# Patient Record
Sex: FEMALE | Race: BLACK OR AFRICAN AMERICAN | NOT HISPANIC OR LATINO | Employment: PART TIME | URBAN - NONMETROPOLITAN AREA
[De-identification: names, ages, dates, MRNs, and addresses within clinical notes are randomized per-mention and may not be internally consistent; named-entity substitution may affect disease eponyms.]

---

## 2018-07-05 PROCEDURE — 87510 GARDNER VAG DNA DIR PROBE: CPT | Performed by: FAMILY MEDICINE

## 2018-07-05 PROCEDURE — 87660 TRICHOMONAS VAGIN DIR PROBE: CPT | Performed by: FAMILY MEDICINE

## 2018-07-05 PROCEDURE — 87591 N.GONORRHOEAE DNA AMP PROB: CPT | Performed by: FAMILY MEDICINE

## 2018-07-05 PROCEDURE — 87491 CHLMYD TRACH DNA AMP PROBE: CPT | Performed by: FAMILY MEDICINE

## 2018-07-05 PROCEDURE — 87480 CANDIDA DNA DIR PROBE: CPT | Performed by: FAMILY MEDICINE

## 2018-07-05 PROCEDURE — 87661 TRICHOMONAS VAGINALIS AMPLIF: CPT | Performed by: FAMILY MEDICINE

## 2018-07-08 ENCOUNTER — TRANSCRIBE ORDERS (OUTPATIENT)
Dept: URGENT CARE | Facility: CLINIC | Age: 18
End: 2018-07-08

## 2018-07-08 DIAGNOSIS — B96.89 BV (BACTERIAL VAGINOSIS): Primary | ICD-10-CM

## 2018-07-08 DIAGNOSIS — N76.0 BV (BACTERIAL VAGINOSIS): Primary | ICD-10-CM

## 2018-07-08 RX ORDER — METRONIDAZOLE 500 MG/1
500 TABLET ORAL 2 TIMES DAILY
Qty: 14 TABLET | Refills: 0 | Status: SHIPPED | OUTPATIENT
Start: 2018-07-08 | End: 2018-07-15

## 2020-07-14 ENCOUNTER — LAB (OUTPATIENT)
Dept: LAB | Facility: HOSPITAL | Age: 20
End: 2020-07-14

## 2020-07-14 ENCOUNTER — INITIAL PRENATAL (OUTPATIENT)
Dept: OBSTETRICS AND GYNECOLOGY | Facility: CLINIC | Age: 20
End: 2020-07-14

## 2020-07-14 VITALS — WEIGHT: 127.6 LBS | SYSTOLIC BLOOD PRESSURE: 92 MMHG | BODY MASS INDEX: 22.6 KG/M2 | DIASTOLIC BLOOD PRESSURE: 56 MMHG

## 2020-07-14 VITALS — DIASTOLIC BLOOD PRESSURE: 56 MMHG | SYSTOLIC BLOOD PRESSURE: 92 MMHG | WEIGHT: 127.6 LBS | BODY MASS INDEX: 22.6 KG/M2

## 2020-07-14 DIAGNOSIS — Z32.00 PREGNANCY EXAMINATION OR TEST, PREGNANCY UNCONFIRMED: ICD-10-CM

## 2020-07-14 DIAGNOSIS — Z34.80 SUPERVISION OF OTHER NORMAL PREGNANCY: Primary | ICD-10-CM

## 2020-07-14 DIAGNOSIS — O09.30 LATE PRENATAL CARE, ANTEPARTUM: ICD-10-CM

## 2020-07-14 LAB
ABO GROUP BLD: NORMAL
AMPHET+METHAMPHET UR QL: NEGATIVE
AMPHETAMINES UR QL: NEGATIVE
B-HCG UR QL: POSITIVE
BARBITURATES UR QL SCN: NEGATIVE
BENZODIAZ UR QL SCN: NEGATIVE
BILIRUB UR QL STRIP: NEGATIVE
BLD GP AB SCN SERPL QL: NEGATIVE
BUPRENORPHINE SERPL-MCNC: NEGATIVE NG/ML
CANNABINOIDS SERPL QL: NEGATIVE
CLARITY UR: ABNORMAL
COCAINE UR QL: NEGATIVE
COLOR UR: YELLOW
GLUCOSE UR STRIP-MCNC: NEGATIVE MG/DL
HGB S BLD QL: NEGATIVE
HGB UR QL STRIP.AUTO: NEGATIVE
INTERNAL NEGATIVE CONTROL: NEGATIVE
INTERNAL POSITIVE CONTROL: POSITIVE
KETONES UR QL STRIP: NEGATIVE
LEUKOCYTE ESTERASE UR QL STRIP.AUTO: ABNORMAL
Lab: ABNORMAL
Lab: NORMAL
METHADONE UR QL SCN: NEGATIVE
NITRITE UR QL STRIP: NEGATIVE
OPIATES UR QL: NEGATIVE
OXYCODONE UR QL SCN: NEGATIVE
PCP UR QL SCN: NEGATIVE
PH UR STRIP.AUTO: 6.5 [PH] (ref 5–8)
PROPOXYPH UR QL: NEGATIVE
PROT UR QL STRIP: ABNORMAL
RH BLD: POSITIVE
SP GR UR STRIP: 1.03 (ref 1–1.03)
TRICYCLICS UR QL SCN: NEGATIVE
UROBILINOGEN UR QL STRIP: ABNORMAL

## 2020-07-14 PROCEDURE — 87661 TRICHOMONAS VAGINALIS AMPLIF: CPT | Performed by: FAMILY MEDICINE

## 2020-07-14 PROCEDURE — 86803 HEPATITIS C AB TEST: CPT | Performed by: FAMILY MEDICINE

## 2020-07-14 PROCEDURE — 81025 URINE PREGNANCY TEST: CPT | Performed by: FAMILY MEDICINE

## 2020-07-14 PROCEDURE — 99203 OFFICE O/P NEW LOW 30 MIN: CPT | Performed by: FAMILY MEDICINE

## 2020-07-14 PROCEDURE — 36415 COLL VENOUS BLD VENIPUNCTURE: CPT

## 2020-07-14 PROCEDURE — 86900 BLOOD TYPING SEROLOGIC ABO: CPT | Performed by: FAMILY MEDICINE

## 2020-07-14 PROCEDURE — 81003 URINALYSIS AUTO W/O SCOPE: CPT | Performed by: FAMILY MEDICINE

## 2020-07-14 PROCEDURE — 86850 RBC ANTIBODY SCREEN: CPT | Performed by: FAMILY MEDICINE

## 2020-07-14 PROCEDURE — 87086 URINE CULTURE/COLONY COUNT: CPT | Performed by: FAMILY MEDICINE

## 2020-07-14 PROCEDURE — 87491 CHLMYD TRACH DNA AMP PROBE: CPT | Performed by: FAMILY MEDICINE

## 2020-07-14 PROCEDURE — 80081 OBSTETRIC PANEL INC HIV TSTG: CPT | Performed by: FAMILY MEDICINE

## 2020-07-14 PROCEDURE — 87591 N.GONORRHOEAE DNA AMP PROB: CPT | Performed by: FAMILY MEDICINE

## 2020-07-14 PROCEDURE — 80306 DRUG TEST PRSMV INSTRMNT: CPT | Performed by: FAMILY MEDICINE

## 2020-07-14 PROCEDURE — 86901 BLOOD TYPING SEROLOGIC RH(D): CPT | Performed by: FAMILY MEDICINE

## 2020-07-14 PROCEDURE — 85660 RBC SICKLE CELL TEST: CPT | Performed by: FAMILY MEDICINE

## 2020-07-14 RX ORDER — CALCIUM CARBONATE 300MG(750)
1 TABLET,CHEWABLE ORAL EVERY MORNING
Qty: 30 TABLET | Refills: 11 | Status: SHIPPED | OUTPATIENT
Start: 2020-07-14 | End: 2020-09-14

## 2020-07-14 RX ORDER — CALCIUM CARBONATE 300MG(750)
TABLET,CHEWABLE ORAL
COMMUNITY
End: 2020-07-14 | Stop reason: SDUPTHER

## 2020-07-14 NOTE — PROGRESS NOTES
[facility]  Obstetrics Visit    CHIEF COMPLAINT:  New prenatal visit    HISTORY OF PRESENT ILLNESS:  Arik Fuentes is a 19 y.o. y/o  at ~ 25wk by fundal height.  Unknown LMP (No LMP recorded. Patient is pregnant.). This was a unplanned pregnancy and the patient is supported by FOB. Reports mild nausea earlier in the pregnancy but that has resolved.   She denies any vaginal bleeding. She has started taking a prenatal vitamin.  Plans to Breastfeed.    PRENATAL RISK FACTORS   Problems (from 20 to present)     No problems associated with this episode.          DATING CRITERIA:  No LMP recorded (lmp unknown). Patient is pregnant. -- Estimated Date of Delivery: None noted.    OBSTETRIC HISTORY:  OB History    Para Term  AB Living   2 1 1     1   SAB TAB Ectopic Molar Multiple Live Births             1      # Outcome Date GA Lbr Steffen/2nd Weight Sex Delivery Anes PTL Lv   2 Current            1 Term 19 38w0d  3062 g (6 lb 12 oz) M Vag-Spont None N MY     GYN HISTORY:  Denies h/o sexually transmitted infections/pelvic inflammatory disease  Denies h/o abnormal pap smears, last pap was   Last Completed Pap Smear       Status Date      PAP SMEAR No completions recorded        Denies h/o gynecologic surgeries, including biopsies of the cervix    PAST MEDICAL HISTORY:  Past Medical History:   Diagnosis Date   • Acute bronchitis    • Acute sinusitis    • Acute upper respiratory infection    • Acute urinary tract infection    • Allergic rhinitis    • Atopic dermatitis    • Chondromalacia of patella    • Chronic rhinitis    • Common cold    • Conjunctivitis     OS   • Cough    • Diarrhea    • Dysuria    • Gastroesophageal reflux disease    • Headache     not wearing glasses   • Influenza due to influenza A subtype H1N1 virus    • Irregular periods    • Knee pain     right knee discomfort   • Nausea     viral   • Nausea and vomiting    • Other seborrheic dermatitis    •  Otitis media    • Pustular acne    • Rash    • Rhinitis    • Sleep apnea    • Upper respiratory infection    • Viral gastroenteritis    • Vulvovaginitis    • Well child check      PAST SURGICAL HISTORY:  Past Surgical History:   Procedure Laterality Date   • ADENOIDECTOMY     • TONSILLECTOMY AND ADENOIDECTOMY  11/09/2005    obstructive sleep apnea syndrome     FAMILY HISTORY:  Family History   Problem Relation Age of Onset   • Asthma Other    • Hypertension Other    • Migraines Other    • Asthma Brother    • Asthma Brother    • Asthma Brother    • Heart defect Brother    • Cancer Brother         of the eye at birth     SOCIAL HISTORY:  Social History     Socioeconomic History   • Marital status: Single     Spouse name: Not on file   • Number of children: Not on file   • Years of education: Not on file   • Highest education level: Not on file   Tobacco Use   • Smoking status: Former Smoker   • Smokeless tobacco: Never Used   Substance and Sexual Activity   • Alcohol use: Never     Frequency: Never   • Drug use: Never   • Sexual activity: Yes     Partners: Male     Comment: unsure of last pap smear      GENETIC SCREENING:  Age >36 yo as of SACHA: no  Thalassemia: no  NTD: no  CHD: no  Down Syndrome/MR/Fragile X/Autism: no  Ashkenazi Presybeterian with Dorian-Sachs, [place], familial dysautonomia: no  Sickle cell disease or trait: unknown  Hemophilia: no  Muscular dystrophy: no  Cystic fibrosis: no  [place]'s chorea: no  Birth defects: no  Genetic/chromosomal disorders: no    INFECTION HISTORY:  TB exposure: no  HSV: no  Illness since LMP: no  Prior GBS infected child: no  STIs: no    ALLERGIES:  No Known Allergies  MEDICATIONS:  Prior to Admission medications    Medication Sig Start Date End Date Taking? Authorizing Provider   cefprozil (CEFZIL) 250 MG tablet Take 250 mg by mouth 2 (Two) Times a Day. 11/1/16   Provider, MD Edd   Prenatal MV-Min-FA-Omega-3 (PRENATAL GUMMIES/DHA & FA) 0.4-32.5 MG chewable tablet Chew.     Provider, MD Edd   phenazopyridine (PYRIDIUM) 200 MG tablet Take 200 mg by mouth 3 (Three) Times a Day As Needed for bladder spasms. 16  ProviderEdd MD       REVIEW OF SYSTEMS  GEN: no fever or chills  CVS: no chest pain or palpitations  RESP: no cough, no shortness of breath  BREAST: no masses or nipple discharge  GI: no nausea, no vomiting, no diarrhea, constipation or abdominal pain  : no dysuria, no vaginal discharge, no vaginal bleeding  CNS: no dizziness or lightheadedness  DERM: no rash, no lesions  MUSC: no myalgias, no gait disturbance  Psych: no depression or anxiety    PHYSICAL EXAM:   BP 92/56   Wt 57.9 kg (127 lb 9.6 oz)   BMI 22.60 kg/m²   General: Alert, healthy, no distress, well nourished and well developed.  Neurologic: Alert, oriented to person, place, and time.  Gait normal.  Cranial nerves II-XII grossly intact.  HEENT: Normocephalic, atraumatic.  Extraocular muscles intact, pupils equal and reactive x2.    Neck: Supple, trachea midline.  Lungs: Clear to auscultation bilaterally, normal effort. No wheezes/rhonchi/rales.  Heart: Regular rate and rhythm.  No murmer, rub or gallop.  Abdomen: Soft, non-tender, gravid. FH 25cm  Skin: No rash, no lesions.  Extremities: No cyanosis, clubbing or edema.  PELVIC EXAM: deferred  Psych: EPDS 8    Bedside ultrasound performed by myself which shows the findings below:  IUP, +cardiac activity, ~25w GA    IMPRESSION:  Arik Fuentes is a 19 y.o.  at ~25wk GA for a new prenatal visit.    PLAN:  1.  IUP at ~25wk by fundal height  - Prenatal labs ordered including Type and Screen, CBC, Rubella, RPR, Hepatitis B and C and HIV  - Gonorrhea and Chlamydia cultures collected from urine  - Urinalysis and urine culture obtained  - Dating/anatomy ultrasound ordered  For 20 10:30AM  - Continue prenatal vitamins  - Weight gain counseling performed.   - BMI <18.5: Recommend 28-40 lb weight gain   - BMI 18.5-24.9:  25-35 lb   - BMI 25-29.9: 15-25 lb   - BMI >30: 11-20 lb    - Return to clinic in 3 weeks for return prenatal visit + 3T labs    Signature  Marsha Randall MD  [facility] [place] [id number]  Office: [phone]    This document has been electronically signed by Marsha Randall MD on July 14, 2020 15:22

## 2020-07-14 NOTE — PROGRESS NOTES
I spent approximately 60 minutes with the patient acquiring the health and history intake and discussing topics related to healthy lifestyle. This is her 2nd pregnancy. Her LMP is January or February. She said she has been moving to [place] and then back to [place] is why she has had no prenatal care.  She had a vaginal delivery in [place]. She signed a release for us to get the delivery note. A newob bag is given. The 1st trimester teaching was done with the patient. We discussed a healthy diet and exercise and what is recommended. I also discussed Listeriosis and Toxoplasmosis and what fish to avoid due to high mercury levels. Informed patient not to be in hot tubs, saunas, or tanning beds. We discussed that spotting may occur after intercourse which is common, but if heavy bleeding like a period occurs to call the [facility] or hospital if clinic is closed.  I encouraged her to make an appointment with the dentist if she has not had a dental exam and cleaning in the last 6 months. The patient denies use of alcohol, illicit drug use, and tobacco smoking. She plans to and  Breastfeed again. I gave her pamphlet on breastfeeding classes and the breastfeeding mothers support group that is provided by Debra Bailey, Lactation Consultant. We discussed the resources that is offered at the health departments, and we discussed that some health departments have a breastfeeding peer counselor. She filled out the health department referral form and depression screening questionnaire. I informed patient that group prenatal education classes are offered here. I instructed patient to let the provider know if she would like to join a group after EDC is established. A Centering Pregnancy pamphlet is given.  I discussed with the patient that a pediatrician needs to be chosen prior to delivery for the infant to have an appointment scheduled before leaving the hospital.  I discussed lab tests will be done today. I let her know a  UDS will be done today and at the time of delivery. She is unsure of when her last pap smear was done. However, she signed a release for us to get the pap smear results. I encouraged the patient to get the TDAP vaccine in the 3rd trimester. She is scheduled to get her ultrasound this Friday, July 17. She is seeing Dr. Randall today.  All questions were answered at this time.

## 2020-07-15 LAB
BASOPHILS # BLD AUTO: 0.03 10*3/MM3 (ref 0–0.2)
BASOPHILS NFR BLD AUTO: 0.4 % (ref 0–1.5)
C TRACH RRNA CVX QL NAA+PROBE: NEGATIVE
DEPRECATED RDW RBC AUTO: 46.4 FL (ref 37–54)
EOSINOPHIL # BLD AUTO: 0.03 10*3/MM3 (ref 0–0.4)
EOSINOPHIL NFR BLD AUTO: 0.4 % (ref 0.3–6.2)
ERYTHROCYTE [DISTWIDTH] IN BLOOD BY AUTOMATED COUNT: 14.7 % (ref 12.3–15.4)
HBV SURFACE AG SERPL QL IA: NORMAL
HCT VFR BLD AUTO: 30.9 % (ref 34–46.6)
HCV AB SER DONR QL: NORMAL
HGB BLD-MCNC: 10.2 G/DL (ref 12–15.9)
HIV1+2 AB SER QL: NORMAL
HOLD SPECIMEN: NORMAL
IMM GRANULOCYTES # BLD AUTO: 0.02 10*3/MM3 (ref 0–0.05)
IMM GRANULOCYTES NFR BLD AUTO: 0.3 % (ref 0–0.5)
LYMPHOCYTES # BLD AUTO: 1.28 10*3/MM3 (ref 0.7–3.1)
LYMPHOCYTES NFR BLD AUTO: 18.4 % (ref 19.6–45.3)
MCH RBC QN AUTO: 28.2 PG (ref 26.6–33)
MCHC RBC AUTO-ENTMCNC: 33 G/DL (ref 31.5–35.7)
MCV RBC AUTO: 85.4 FL (ref 79–97)
MONOCYTES # BLD AUTO: 0.39 10*3/MM3 (ref 0.1–0.9)
MONOCYTES NFR BLD AUTO: 5.6 % (ref 5–12)
N GONORRHOEA RRNA SPEC QL NAA+PROBE: NEGATIVE
NEUTROPHILS NFR BLD AUTO: 5.2 10*3/MM3 (ref 1.7–7)
NEUTROPHILS NFR BLD AUTO: 74.9 % (ref 42.7–76)
NRBC BLD AUTO-RTO: 0 /100 WBC (ref 0–0.2)
PLATELET # BLD AUTO: 292 10*3/MM3 (ref 140–450)
PMV BLD AUTO: 10.4 FL (ref 6–12)
RBC # BLD AUTO: 3.62 10*6/MM3 (ref 3.77–5.28)
RUBV IGG SERPL IA-ACNC: POSITIVE
TRICHOMONAS VAGINALIS PCR: NEGATIVE
WBC # BLD AUTO: 6.95 10*3/MM3 (ref 3.4–10.8)

## 2020-07-16 LAB
BACTERIA SPEC AEROBE CULT: NORMAL
RPR SER QL: NORMAL

## 2020-07-21 DIAGNOSIS — Z36.3 ANTENATAL SCREENING FOR MALFORMATION USING ULTRASONICS: Primary | ICD-10-CM

## 2020-07-21 DIAGNOSIS — Z36.2 ENCOUNTER FOR REPEAT ULTRASOUND FOR FETAL HEART RATE: ICD-10-CM

## 2020-07-23 DIAGNOSIS — Z36.3 ANTENATAL SCREENING FOR MALFORMATION USING ULTRASONICS: ICD-10-CM

## 2020-08-04 DIAGNOSIS — Z13.1 ENCOUNTER FOR SCREENING FOR DIABETES MELLITUS: Primary | ICD-10-CM

## 2020-08-12 ENCOUNTER — ROUTINE PRENATAL (OUTPATIENT)
Dept: OBSTETRICS AND GYNECOLOGY | Facility: CLINIC | Age: 20
End: 2020-08-12

## 2020-08-12 ENCOUNTER — LAB (OUTPATIENT)
Dept: LAB | Facility: HOSPITAL | Age: 20
End: 2020-08-12

## 2020-08-12 VITALS — BODY MASS INDEX: 23.06 KG/M2 | WEIGHT: 130.2 LBS | SYSTOLIC BLOOD PRESSURE: 94 MMHG | DIASTOLIC BLOOD PRESSURE: 62 MMHG

## 2020-08-12 DIAGNOSIS — O09.30 LATE PRENATAL CARE, ANTEPARTUM: ICD-10-CM

## 2020-08-12 DIAGNOSIS — Z34.80 SUPERVISION OF OTHER NORMAL PREGNANCY: ICD-10-CM

## 2020-08-12 DIAGNOSIS — Z13.1 ENCOUNTER FOR SCREENING FOR DIABETES MELLITUS: ICD-10-CM

## 2020-08-12 DIAGNOSIS — IMO0002 EVALUATE ANATOMY NOT SEEN ON PRIOR SONOGRAM: ICD-10-CM

## 2020-08-12 DIAGNOSIS — Z3A.27 27 WEEKS GESTATION OF PREGNANCY: Primary | ICD-10-CM

## 2020-08-12 LAB
BASOPHILS # BLD AUTO: 0.03 10*3/MM3 (ref 0–0.2)
BASOPHILS NFR BLD AUTO: 0.4 % (ref 0–1.5)
DEPRECATED RDW RBC AUTO: 45.4 FL (ref 37–54)
DIFFERENTIAL METHOD BLD: ABNORMAL
EOSINOPHIL # BLD AUTO: 0.07 10*3/MM3 (ref 0–0.4)
EOSINOPHIL NFR BLD AUTO: 0.9 % (ref 0.3–6.2)
ERYTHROCYTE [DISTWIDTH] IN BLOOD BY AUTOMATED COUNT: 14.5 % (ref 12.3–15.4)
GLUCOSE 1H P 100 G GLC PO SERPL-MCNC: 81 MG/DL (ref 60–140)
HCT VFR BLD AUTO: 27.6 % (ref 34–46.6)
HGB BLD-MCNC: 9.1 G/DL (ref 12–15.9)
IMM GRANULOCYTES # BLD AUTO: 0.03 10*3/MM3 (ref 0–0.05)
IMM GRANULOCYTES NFR BLD AUTO: 0.4 % (ref 0–0.5)
LYMPHOCYTES # BLD AUTO: 1.17 10*3/MM3 (ref 0.7–3.1)
LYMPHOCYTES NFR BLD AUTO: 15.4 % (ref 19.6–45.3)
MCH RBC QN AUTO: 28.3 PG (ref 26.6–33)
MCHC RBC AUTO-ENTMCNC: 33 G/DL (ref 31.5–35.7)
MCV RBC AUTO: 85.7 FL (ref 79–97)
MONOCYTES # BLD AUTO: 0.42 10*3/MM3 (ref 0.1–0.9)
MONOCYTES NFR BLD AUTO: 5.5 % (ref 5–12)
NEUTROPHILS NFR BLD AUTO: 5.88 10*3/MM3 (ref 1.7–7)
NEUTROPHILS NFR BLD AUTO: 77.4 % (ref 42.7–76)
NRBC BLD AUTO-RTO: 0 /100 WBC (ref 0–0.2)
PLATELET # BLD AUTO: 256 10*3/MM3 (ref 140–450)
PMV BLD AUTO: 11.1 FL (ref 6–12)
RBC # BLD AUTO: 3.22 10*6/MM3 (ref 3.77–5.28)
WBC # BLD AUTO: 7.6 10*3/MM3 (ref 3.4–10.8)
WBC # BLD AUTO: 7.6 10*3/MM3 (ref 3.4–10.8)

## 2020-08-12 PROCEDURE — 99213 OFFICE O/P EST LOW 20 MIN: CPT | Performed by: FAMILY MEDICINE

## 2020-08-12 PROCEDURE — 36415 COLL VENOUS BLD VENIPUNCTURE: CPT

## 2020-08-12 PROCEDURE — 82950 GLUCOSE TEST: CPT

## 2020-08-12 PROCEDURE — 85025 COMPLETE CBC W/AUTO DIFF WBC: CPT

## 2020-08-12 NOTE — PROGRESS NOTES
CC: Prenatal visit    Arik Fuentes is a 20 y.o.  at 27w5d.  Doing well.  No complaints.  Denies contractions, LOF, or VB.  Reports good FM. 1hr glucola in progress.     US: cephalic, posterior placenta, FHR 143bpm, EFW 1092g (2lb7oz) 35%tile, AC 24%tile, sub opt spine remains. Findings d/w patient/FOB    BP 94/62   Wt 59.1 kg (130 lb 3.2 oz)   LMP  (LMP Unknown)   BMI 23.06 kg/m²   SVE: deferred  Fundal Height (cm): 37 cm  Fetal Heart Rate: 143 US     Problems (from 20 to present)     Problem Noted Resolved    Late prenatal care, antepartum 2020 by Marsha Randall MD No    Supervision of other normal pregnancy 2020 by Marsha Randall MD No    Overview Addendum 2020  8:00 AM by Marsha Randall MD     A POS/ Rubella Immune/ GBS @ 36wks  Dating: LMP ? 1TUS  Genetics: n/a  Tdap: 28wks  Flu: n/a  Anatomy: 20wks  1h Glucola: 28wks  Lab Results   Component Value Date    HGB 10.2 (L) 2020    HCT 30.9 (L) 2020     2020     Feeding Method: Breastfeed  BC plan: ?                 A/P: Arik Fuentes is a 20 y.o.  at 27w5d.  - RTC in 3 weeks with TAUS for sub opt views  - FKC reviewed.  PTL precautions given.  Encouraged to drink 3-4 glasses of ice water if she experiences contractions.  If contractions occur regularly (every 5-10 minutes or less) for 1 hour and do not resolve with drinking fluids and/or taking a warm bath, patient advised to come to L&D for evaluation.  - TDAP at next visit      Diagnosis Plan   1. 27 weeks gestation of pregnancy     2. Late prenatal care, antepartum     3. Supervision of other normal pregnancy     4. Evaluate anatomy not seen on prior sonogram  US Ob Follow Up Transabdominal Approach       Signature  Marsha Randall MD  [facility] [address]  Office: [phone]      This document has been electronically signed by  Marsha Randall MD on August 12, 2020 09:58

## 2020-08-13 ENCOUNTER — TELEPHONE (OUTPATIENT)
Dept: OBSTETRICS AND GYNECOLOGY | Facility: CLINIC | Age: 20
End: 2020-08-13

## 2020-08-13 DIAGNOSIS — O99.019 MATERNAL ANEMIA IN PREGNANCY, ANTEPARTUM: Primary | ICD-10-CM

## 2020-08-13 RX ORDER — FERROUS SULFATE 325(65) MG
325 TABLET ORAL
Qty: 30 TABLET | Refills: 3 | Status: SHIPPED | OUTPATIENT
Start: 2020-08-13 | End: 2021-06-28

## 2020-08-13 RX ORDER — AMOXICILLIN 250 MG
1 CAPSULE ORAL DAILY
Qty: 30 TABLET | Refills: 2 | Status: SHIPPED | OUTPATIENT
Start: 2020-08-13 | End: 2021-06-28

## 2020-08-13 NOTE — TELEPHONE ENCOUNTER
----- Message from Marsha Randall MD sent at 8/12/2020  5:34 PM CDT -----  Please let patient know she passed her glucose screening.

## 2020-08-17 ENCOUNTER — TELEPHONE (OUTPATIENT)
Dept: OBSTETRICS AND GYNECOLOGY | Facility: CLINIC | Age: 20
End: 2020-08-17

## 2020-08-17 NOTE — TELEPHONE ENCOUNTER
----- Message from Marsha Randall MD sent at 8/13/2020  4:52 PM CDT -----  Please inform patient iron supplement for her anemia.  I have also sent in a stool softener so she doesn't get constipated from the iron.

## 2020-08-17 NOTE — TELEPHONE ENCOUNTER
I called this patient and informed her of the iron results.  I also told her about the stool softener.

## 2020-08-18 DIAGNOSIS — Z36.2 ENCOUNTER FOR REPEAT ULTRASOUND FOR FETAL HEART RATE: ICD-10-CM

## 2020-09-14 ENCOUNTER — ROUTINE PRENATAL (OUTPATIENT)
Dept: OBSTETRICS AND GYNECOLOGY | Facility: CLINIC | Age: 20
End: 2020-09-14

## 2020-09-14 VITALS — BODY MASS INDEX: 22.75 KG/M2 | SYSTOLIC BLOOD PRESSURE: 92 MMHG | DIASTOLIC BLOOD PRESSURE: 64 MMHG | WEIGHT: 128.4 LBS

## 2020-09-14 DIAGNOSIS — Z3A.32 32 WEEKS GESTATION OF PREGNANCY: Primary | ICD-10-CM

## 2020-09-14 DIAGNOSIS — Z34.80 SUPERVISION OF OTHER NORMAL PREGNANCY: ICD-10-CM

## 2020-09-14 DIAGNOSIS — Z28.21 TETANUS, DIPHTHERIA, AND ACELLULAR PERTUSSIS (TDAP) VACCINATION DECLINED: ICD-10-CM

## 2020-09-14 DIAGNOSIS — O09.30 LATE PRENATAL CARE, ANTEPARTUM: ICD-10-CM

## 2020-09-14 PROCEDURE — 99213 OFFICE O/P EST LOW 20 MIN: CPT | Performed by: FAMILY MEDICINE

## 2020-09-14 RX ORDER — FAMOTIDINE 20 MG
1 TABLET ORAL DAILY
Qty: 60 EACH | Refills: 3 | Status: SHIPPED | OUTPATIENT
Start: 2020-09-14 | End: 2021-06-28

## 2020-09-14 NOTE — PROGRESS NOTES
CC: Prenatal visit    Arik Fuentes is a 20 y.o.  at 32w3d.  Doing well.  No complaints.  Denies contractions, LOF, or VB.  Reports good FM.    TAUS: cephalic, posterior placenta, FHR 130bpm,  (4lb3oz) 34%tile, AC 30%tile, BPP 8/8, previous sub opt views obtained - anatomy wnl.     BP 92/64   Wt 58.2 kg (128 lb 6.4 oz)   LMP  (LMP Unknown)   BMI 22.75 kg/m²   SVE: deferred  Fundal Height (cm): 32 cm  Fetal Heart Rate: 130     Problems (from 20 to present)     Problem Noted Resolved    Late prenatal care, antepartum 2020 by Marsha Randall MD No    Supervision of other normal pregnancy 2020 by Marsha Randall MD No    Overview Addendum 2020  5:34 PM by Marsha Randall MD     A POS/ Rubella Immune/ GBS @ 36wks  Dating: LMP ? 1TUS  Genetics: n/a  Tdap: 28wks  Flu: n/a  Anatomy: 20wks  1h Glucola: 81  Lab Results   Component Value Date    HGB 10.2 (L) 2020    HCT 30.9 (L) 2020     2020     Feeding Method: Breastfeed  BC plan: ?                 A/P: Arik Fuentes is a 20 y.o.  at 32w3d.  - RTC in 2 weeks  - Declined TDAP  - FKC reviewed.  PTL precautions given.  Encouraged to drink 3-4 glasses of ice water if she experiences contractions.  If contractions occur regularly (every 5-10 minutes or less) for 1 hour and do not resolve with drinking fluids and/or taking a warm bath, patient advised to come to L&D for evaluation.       Diagnosis Plan   1. 32 weeks gestation of pregnancy     2. Late prenatal care, antepartum     3. Supervision of other normal pregnancy     4. Tetanus, diphtheria, and acellular pertussis (Tdap) vaccination declined         Signature  Marsha Randall MD  [facility] [address] [address]  Office: [phone]      This document has been electronically signed by Marsha Randall MD on 2020 14:16 CDT

## 2020-09-21 DIAGNOSIS — IMO0002 EVALUATE ANATOMY NOT SEEN ON PRIOR SONOGRAM: ICD-10-CM

## 2020-09-29 ENCOUNTER — ROUTINE PRENATAL (OUTPATIENT)
Dept: OBSTETRICS AND GYNECOLOGY | Facility: CLINIC | Age: 20
End: 2020-09-29

## 2020-09-29 VITALS — BODY MASS INDEX: 23.03 KG/M2 | WEIGHT: 130 LBS | DIASTOLIC BLOOD PRESSURE: 62 MMHG | SYSTOLIC BLOOD PRESSURE: 100 MMHG

## 2020-09-29 DIAGNOSIS — Z34.80 SUPERVISION OF OTHER NORMAL PREGNANCY: ICD-10-CM

## 2020-09-29 DIAGNOSIS — O09.30 LATE PRENATAL CARE, ANTEPARTUM: ICD-10-CM

## 2020-09-29 DIAGNOSIS — Z3A.34 34 WEEKS GESTATION OF PREGNANCY: Primary | ICD-10-CM

## 2020-09-29 DIAGNOSIS — M54.50 LOW BACK PAIN DURING PREGNANCY, THIRD TRIMESTER: ICD-10-CM

## 2020-09-29 DIAGNOSIS — O26.893 LOW BACK PAIN DURING PREGNANCY, THIRD TRIMESTER: ICD-10-CM

## 2020-09-29 PROCEDURE — 99213 OFFICE O/P EST LOW 20 MIN: CPT | Performed by: NURSE PRACTITIONER

## 2020-09-29 NOTE — PROGRESS NOTES
CC: Prenatal visit    Arik Fuentes is a 20 y.o.  at 34w4d.  Doing well.  Pt complains of low back pain for past several weeks.  She reports pain down her legs at time as well.  Denies dysuria, contractions, LOF, or VB.  Reports good FM.    /62   Wt 59 kg (130 lb)   LMP  (LMP Unknown)   BMI 23.03 kg/m²              Problems (from 20 to present)     Problem Noted Resolved    Late prenatal care, antepartum 2020 by Marsha Randall MD No    Supervision of other normal pregnancy 2020 by Marsha Randall MD No    Overview Addendum 2020  2:17 PM by Marsha Randall MD     A POS/ Rubella Immune/ GBS @ 36wks  Dating: LMP ? 1TUS  Genetics: n/a  Tdap: declined  Flu: n/a  Anatomy: post placenta  1h Glucola: 81  Lab Results   Component Value Date    HGB 10.2 (L) 2020    HCT 30.9 (L) 2020     2020     Feeding Method: Breastfeed  BC plan: ?                 A/P: Arik Fuentes is a 20 y.o.  at 34w4d.  - RTC in 1.5 weeks for REMEDIOS appt or sooner if needed      Diagnosis Plan   1. 34 weeks gestation of pregnancy     2. Supervision of other normal pregnancy     3. Late prenatal care, antepartum     4. Low back pain during pregnancy, third trimester  Ambulatory Referral to Physical Therapy Evaluate and treat       RAE Cruz  2020  14:05 CDT

## 2020-10-06 ENCOUNTER — ROUTINE PRENATAL (OUTPATIENT)
Dept: OBSTETRICS AND GYNECOLOGY | Facility: CLINIC | Age: 20
End: 2020-10-06

## 2020-10-06 VITALS — WEIGHT: 130.4 LBS | DIASTOLIC BLOOD PRESSURE: 74 MMHG | SYSTOLIC BLOOD PRESSURE: 118 MMHG | BODY MASS INDEX: 23.1 KG/M2

## 2020-10-06 DIAGNOSIS — O09.30 LATE PRENATAL CARE, ANTEPARTUM: ICD-10-CM

## 2020-10-06 DIAGNOSIS — O26.893 PELVIC PRESSURE IN PREGNANCY, THIRD TRIMESTER: ICD-10-CM

## 2020-10-06 DIAGNOSIS — Z34.80 SUPERVISION OF OTHER NORMAL PREGNANCY: ICD-10-CM

## 2020-10-06 DIAGNOSIS — Z3A.35 35 WEEKS GESTATION OF PREGNANCY: Primary | ICD-10-CM

## 2020-10-06 DIAGNOSIS — O26.893 LOW BACK PAIN DURING PREGNANCY, THIRD TRIMESTER: ICD-10-CM

## 2020-10-06 DIAGNOSIS — M54.50 LOW BACK PAIN DURING PREGNANCY, THIRD TRIMESTER: ICD-10-CM

## 2020-10-06 DIAGNOSIS — R10.2 PELVIC PRESSURE IN PREGNANCY, THIRD TRIMESTER: ICD-10-CM

## 2020-10-06 PROCEDURE — 99213 OFFICE O/P EST LOW 20 MIN: CPT | Performed by: FAMILY MEDICINE

## 2020-10-06 NOTE — PROGRESS NOTES
CC: Prenatal visit    Arik Fuentes is a 20 y.o.  at 35w4d.  Doing well.  Denies contractions, LOF, or VB.  Reports good FM. Complains of low back pain & pelvic pressure.    /74   Wt 59.1 kg (130 lb 6.4 oz)   LMP  (LMP Unknown)   BMI 23.10 kg/m²   SVE: deferred  Fundal Height (cm): 35 cm  Fetal Heart Rate: 150     Problems (from 20 to present)     Problem Noted Resolved    Late prenatal care, antepartum 2020 by Marsha Randall MD No    Supervision of other normal pregnancy 2020 by Marsha Randall MD No    Overview Addendum 2020  2:17 PM by Marsha Randall MD     A POS/ Rubella Immune/ GBS @ 36wks  Dating: LMP ? 1TUS  Genetics: n/a  Tdap: declined  Flu: n/a  Anatomy: post placenta  1h Glucola: 81  Lab Results   Component Value Date    HGB 10.2 (L) 2020    HCT 30.9 (L) 2020     2020     Feeding Method: Breastfeed  BC plan: ?                 A/P: Arik Fuentes is a 20 y.o.  at 35w4d.  - RTC in 1 weeks with GBS swab  - Pregnancy support belt given  - FKC reviewed.  PTL precautions given.  Encouraged to drink 3-4 glasses of ice water if she experiences contractions.  If contractions occur regularly (every 5-10 minutes or less) for 1 hour and do not resolve with drinking fluids and/or taking a warm bath, patient advised to come to L&D for evaluation.       Diagnosis Plan   1. 35 weeks gestation of pregnancy     2. Late prenatal care, antepartum     3. Supervision of other normal pregnancy     4. Pelvic pressure in pregnancy, third trimester     5. Low back pain during pregnancy, third trimester         Signature  Marsha Randall MD  [facility]  [address] [place] [address]  Office: [phone]      This document has been electronically signed by Marsha Randall MD on 2020 15:38 CDT

## 2020-10-12 DIAGNOSIS — Z36.85 ENCOUNTER FOR ANTENATAL SCREENING FOR STREPTOCOCCUS B: Primary | ICD-10-CM

## 2020-10-13 ENCOUNTER — ROUTINE PRENATAL (OUTPATIENT)
Dept: OBSTETRICS AND GYNECOLOGY | Facility: CLINIC | Age: 20
End: 2020-10-13

## 2020-10-13 VITALS — WEIGHT: 129.6 LBS | BODY MASS INDEX: 22.96 KG/M2 | SYSTOLIC BLOOD PRESSURE: 98 MMHG | DIASTOLIC BLOOD PRESSURE: 60 MMHG

## 2020-10-13 DIAGNOSIS — Z36.85 ENCOUNTER FOR ANTENATAL SCREENING FOR STREPTOCOCCUS B: ICD-10-CM

## 2020-10-13 DIAGNOSIS — Z3A.36 36 WEEKS GESTATION OF PREGNANCY: Primary | ICD-10-CM

## 2020-10-13 DIAGNOSIS — O09.30 LATE PRENATAL CARE, ANTEPARTUM: ICD-10-CM

## 2020-10-13 DIAGNOSIS — Z34.80 SUPERVISION OF OTHER NORMAL PREGNANCY: ICD-10-CM

## 2020-10-13 PROCEDURE — 87653 STREP B DNA AMP PROBE: CPT | Performed by: FAMILY MEDICINE

## 2020-10-13 PROCEDURE — 99213 OFFICE O/P EST LOW 20 MIN: CPT | Performed by: FAMILY MEDICINE

## 2020-10-13 NOTE — PROGRESS NOTES
CC: Prenatal visit    Arik Fuentes is a 20 y.o.  at 36w4d.  Doing well.  Denies LOF, or VB.  Reports good FM. Reports some chapito amaral contractions.    BP 98/60   Wt 58.8 kg (129 lb 9.6 oz)   LMP  (LMP Unknown)   BMI 22.96 kg/m²   SVE: 2/50/-2 BBOW, posterior/soft  Fundal Height (cm): 36 cm  Fetal Heart Rate: 130     Problems (from 20 to present)     Problem Noted Resolved    Late prenatal care, antepartum 2020 by Marsha Randall MD No    Supervision of other normal pregnancy 2020 by Marsha Randall MD No    Overview Addendum 2020  2:17 PM by Marsha Randall MD     A POS/ Rubella Immune/ GBS @ 36wks  Dating: LMP ? 1TUS  Genetics: n/a  Tdap: declined  Flu: n/a  Anatomy: post placenta  1h Glucola: 81  Lab Results   Component Value Date    HGB 10.2 (L) 2020    HCT 30.9 (L) 2020     2020     Feeding Method: Breastfeed  BC plan: ?                 A/P: Arik Fuentes is a 20 y.o.  at 36w4d.  - RTC in 1 weeks  - FKC reviewed.  PTL precautions given.  Encouraged to drink 3-4 glasses of ice water if she experiences contractions.  If contractions occur regularly (every 5-10 minutes or less) for 1 hour and do not resolve with drinking fluids and/or taking a warm bath, patient advised to come to L&D for evaluation.  - GBS collected today     Diagnosis Plan   1. 36 weeks gestation of pregnancy     2. Late prenatal care, antepartum     3. Supervision of other normal pregnancy     4. Encounter for  screening for Streptococcus B  Group B Strep (Molecular) - Swab, Vaginal/Rectum       Signature  Marsha Randall MD  [facility] [address] [address]  Office: [phone]      This document has been electronically signed by Marsha Randall MD on 2020 15:24 CDT

## 2020-10-14 LAB — GROUP B STREP, DNA: NEGATIVE

## 2020-10-15 DIAGNOSIS — Z36.85 ANTENATAL SCREENING FOR STREPTOCOCCUS B: Primary | ICD-10-CM

## 2020-10-19 ENCOUNTER — HOSPITAL ENCOUNTER (OUTPATIENT)
Dept: PHYSICAL THERAPY | Facility: HOSPITAL | Age: 20
Setting detail: THERAPIES SERIES
Discharge: HOME | End: 2020-10-19

## 2020-10-19 ENCOUNTER — ROUTINE PRENATAL (OUTPATIENT)
Dept: OBSTETRICS AND GYNECOLOGY | Facility: CLINIC | Age: 20
End: 2020-10-19

## 2020-10-19 VITALS — WEIGHT: 134.6 LBS | SYSTOLIC BLOOD PRESSURE: 102 MMHG | BODY MASS INDEX: 23.84 KG/M2 | DIASTOLIC BLOOD PRESSURE: 68 MMHG

## 2020-10-19 DIAGNOSIS — O99.891 BACK PAIN AFFECTING PREGNANCY IN THIRD TRIMESTER: Primary | ICD-10-CM

## 2020-10-19 DIAGNOSIS — Z3A.37 37 WEEKS GESTATION OF PREGNANCY: Primary | ICD-10-CM

## 2020-10-19 DIAGNOSIS — O09.30 LATE PRENATAL CARE, ANTEPARTUM: ICD-10-CM

## 2020-10-19 DIAGNOSIS — M54.9 BACK PAIN AFFECTING PREGNANCY IN THIRD TRIMESTER: Primary | ICD-10-CM

## 2020-10-19 DIAGNOSIS — Z34.80 SUPERVISION OF OTHER NORMAL PREGNANCY: ICD-10-CM

## 2020-10-19 PROCEDURE — 97140 MANUAL THERAPY 1/> REGIONS: CPT | Performed by: PHYSICAL THERAPIST

## 2020-10-19 PROCEDURE — 99213 OFFICE O/P EST LOW 20 MIN: CPT | Performed by: FAMILY MEDICINE

## 2020-10-19 PROCEDURE — 97162 PT EVAL MOD COMPLEX 30 MIN: CPT | Performed by: PHYSICAL THERAPIST

## 2020-10-19 NOTE — PROGRESS NOTES
CC: Prenatal visit    Arik Fuentes is a 20 y.o.  at 37w3d.  Doing well.  No complaints.  Denies contractions, LOF, or VB.  Reports good FM.    /68   Wt 61.1 kg (134 lb 9.6 oz)   LMP  (LMP Unknown)   BMI 23.84 kg/m²   SVE: deferred  Fundal Height (cm): 37 cm  Fetal Heart Rate: 145     Problems (from 20 to present)     Problem Noted Resolved    Late prenatal care, antepartum 2020 by Marsha Randall MD No    Supervision of other normal pregnancy 2020 by Marsha Randall MD No    Overview Addendum 10/14/2020  8:58 AM by Marsha Randall MD     A POS/ Rubella Immune/ GBS NEG  Dating: LMP ? 1TUS  Genetics: n/a  Tdap: declined  Flu: n/a  Anatomy: post placenta  1h Glucola: 81  Lab Results   Component Value Date    HGB 10.2 (L) 2020    HCT 30.9 (L) 2020     2020     Feeding Method: Breastfeed  BC plan: ?                 A/P: Arik Fuentes is a 20 y.o.  at 37w3d.  - RTC in 1 weeks  - FKC reviewed, labor signs/symptoms discussed. Patient knows which phone # to call if she thinks she is in labor.       Diagnosis Plan   1. 37 weeks gestation of pregnancy     2. Late prenatal care, antepartum     3. Supervision of other normal pregnancy         Signature  Marsha Randall MD  [facility]  [address] [place] [address]  Office: [phone]      This document has been electronically signed by Marsha Randall MD on 2020 15:06 CDT

## 2020-10-19 NOTE — THERAPY EVALUATION
Outpatient Physical Therapy Pelvic Health Initial Evaluation  [facility]     Patient Name: Arik Fuentes  : 2000  MRN: [id number]  Today's Date: 10/19/2020        Visit Date: 10/19/2020  Visit number:   Recheck: 20  Insurance: [place] Medicaid, 15 v per year    Patient Active Problem List   Diagnosis   • Late prenatal care, antepartum   • Supervision of other normal pregnancy        Past Medical History:   Diagnosis Date   • Acute bronchitis    • Acute sinusitis    • Acute upper respiratory infection    • Acute urinary tract infection    • Allergic rhinitis    • Atopic dermatitis    • Chondromalacia of patella    • Chronic rhinitis    • Common cold    • Conjunctivitis     OS   • Cough    • Diarrhea    • Dysuria    • Gastroesophageal reflux disease    • Headache     not wearing glasses   • Influenza due to influenza A subtype H1N1 virus    • Irregular periods    • Knee pain     right knee discomfort   • Nausea     viral   • Nausea and vomiting    • Other seborrheic dermatitis    • Otitis media    • Pustular acne    • Rash    • Rhinitis    • Sleep apnea    • Upper respiratory infection    • Viral gastroenteritis    • Vulvovaginitis    • Well child check         Past Surgical History:   Procedure Laterality Date   • ADENOIDECTOMY     • TONSILLECTOMY AND ADENOIDECTOMY  2005    obstructive sleep apnea syndrome     Current Outpatient Medications on File Prior to Encounter   Medication Sig Dispense Refill   • ferrous sulfate 325 (65 FE) MG tablet Take 1 tablet by mouth Daily With Breakfast. 30 tablet 3   • Prenatal Vit-Fe Fumarate-FA (Prenatal Complete) 14-0.4 MG tablet Take 1 each by mouth Daily. 60 each 3   • sennosides-docusate (senna-docusate sodium) 8.6-50 MG per tablet Take 1 tablet by mouth Daily. 30 tablet 2     No current facility-administered medications on file prior to encounter.      No Known Allergies      Visit Dx:    ICD-10-CM ICD-9-CM   1. Back pain affecting pregnancy  in third trimester  O99.891 646.83    M54.9 724.5           Pelvic Health     Row Name 10/19/20 1400             Subjective Comments    Subjective Comments  Has already dilated 2 cm at this time.  Symptoms of pelvic pressure and lower abdominal wall.  Hurts toward her pubic bone and some into her LB.  Onset of pain about 2-3 months.  Feels that s/s have worsened.  Intermittent numbness in L side.  Pain is intermittent but intense when present.  Some contractions noted in morning and some awakened in sleep.    -SW         Pregnancy Questions    Number of Pregnancies  2  -SW      Number of Miscarriages  0  -SW      Has the patient had an ?  No  -SW      Number of Children  1 1 year old son at home.   -SW      How many weeks pregnant are you?  38 weeks  -SW      Type of Previous Deliveries  Vaginal  -SW      Due Date  20 Girl  -SW         Pain Assessment    Pain Assessment  0-10  -SW      Pain Score  0  -SW      Post Pain Score  0  -SW        User Key  (r) = Recorded By, (t) = Taken By, (c) = Cosigned By    Initials Name Provider Type    Alisa Macario, PT DPT Physical Therapist        PT Ortho     Row Name 10/19/20 1400       Posture/Observations    Posture- WNL  Posture is WNL  -SW    Posture/Observations Comments  Spine structure aligned without deviation from norm.  Good functional movement and gait.  all independent with transfers. IC slightly elevated on R side in standing.   -SW       Quarter Clearing    Quarter Clearing  Lower Quarter Clearing  -SW       DTR- Lower Quarter Clearing    Patellar tendon (L2-4)  2- Normal response  -SW    Achilles tendon (S1-2)  2- Normal response  -SW       Neural Tension Signs- Lower Quarter Clearing    Slump  Negative  -SW    SLR  Negative  -SW       Sensory Screen for Light Touch- Lower Quarter Clearing    L1 (inguinal area)  Intact  -SW    L2 (anterior mid thigh)  Intact  -SW    L3 (distal anterior thigh)  Intact  -SW    L4 (medial lower leg/foot)  Intact   -SW    L5 (lateral lower leg/great toe)  Intact  -SW    S1 (bottom of foot)  Intact  -SW       Myotomal Screen- Lower Quarter Clearing    Hip flexion (L2)  5 (Normal)  -SW    Knee extension (L3)  5 (Normal)  -SW    Knee flexion (S2)  5 (Normal)  -SW       Lumbar ROM Screen- Lower Quarter Clearing    Lumbar Flexion  Normal  -SW    Lumbar Extension  Normal  -SW    Lumbar Lateral Flexion  Normal  -SW    Lumbar Rotation  Normal  -SW       SI/Hip Screen- Lower Quarter Clearing    ASIS compression  Bilateral:;Positive  -SW    Hammad's/Chris's test  Negative  -SW    Pain in Lori's area  Left:;Positive  -SW       Special Tests/Palpation    Special Tests/Palpation  Lumbar/SI  -SW       Lumbosacral Accessory Motions    Lumbosacral Accessory Motions Tested?  Yes  -SW    PA glide- Sacral base  -- L side elevated   -SW    Innominate rotation  -- ant rotation on R   -SW       Lumbar/SI Special Tests    Lumbar/SI Special Tests Comments  patient presents with musculoskeletal complaints of R ant innom and sacral torsion L on R.   -SW       Lumbosacral Palpation    Lumbosacral Palpation?  Yes  -SW    SI  Left:;Guarded/taut L on R sacral torsion  -SW    Lumbosacral Segment  Bilateral:;Guarded/taut  -SW    Erector Spinae (Paraspinals)  Bilateral:;Guarded/taut  -SW    Lumbosacral Palpation Comments  tenderness noted throughout lumbosacral region.  Inc lordosis with tension across sacral ligaments.    -SW       Special Lumbosacral Questions    Are you pregnant?  Yes  -SW    Have you had any abdominal surgeries?  No  -SW      User Key  (r) = Recorded By, (t) = Taken By, (c) = Cosigned By    Initials Name Provider Type    Alisa Macario, PT DPT Physical Therapist                     PT Assessment/Plan     Row Name 10/19/20 2644          PT Assessment    Assessment Comments  Patient is a 21 yo female presenting at 38 weeks gestation with complaints of pain in LB.  She presents with musculoskeletal complaints of L on R sacral  torsion and R ant innom with increasing tension across LB.  She is ray and anticipating delivery in next 2 weeks.  She would benefit from skilled therapy to improved mobility and reduce pain prior to delivery.   -SW     Rehab Potential  Good  -     Patient/caregiver participated in establishment of treatment plan and goals  Yes  -SW     Patient would benefit from skilled therapy intervention  Yes  -SW        PT Plan    PT Frequency  1x/week  -     PT Plan Comments  Estimated 2-3 visits of PT for manual work and stretching prior to delivery in order to improve alignment, dec pain and improve function.   -       User Key  (r) = Recorded By, (t) = Taken By, (c) = Cosigned By    Initials Name Provider Type    Alisa Macario, PT DPT Physical Therapist            OP Exercises     Row Name 10/19/20 1400             Subjective Comments    Subjective Comments  Has already dilated 2 cm at this time.  Symptoms of pelvic pressure and lower abdominal wall.  Hurts toward her pubic bone and some into her LB.  Onset of pain about 2-3 months.  Feels that s/s have worsened.  Intermittent numbness in L side.  Pain is intermittent but intense when present.  Some contractions noted in morning and some awakened in sleep.    -SW         Exercise 1    Exercise Name 1  angry cat stretch   -SW      Reps 1  10  -SW      Time 1  5 sec  -SW         Exercise 2    Exercise Name 2  piriformis stretch   -SW      Reps 2  3  -SW      Time 2  30 sec  -SW        User Key  (r) = Recorded By, (t) = Taken By, (c) = Cosigned By    Initials Name Provider Type    Alisa Macario, PT DPT Physical Therapist           Manual Rx (last 36 hours)      Manual Treatments     Row Name 10/19/20 1700             Manual Rx 1    Manual Rx 1 Location  R ant innom  -SW         Manual Rx 2    Manual Rx 2 Location  lumbosacral release  -SW      Manual Rx 2 Type  MFR and cupping  -SW         Manual Rx 3    Manual Rx 3 Location  manual  glut/piriformis stretch L side.  -        User Key  (r) = Recorded By, (t) = Taken By, (c) = Cosigned By    Initials Name Provider Type    Alisa Macario, PT DPT Physical Therapist                    PT OP Goals     Row Name 10/19/20 1737 10/19/20 1700       PT Short Term Goals    STG Date to Achieve  --  11/19/20  -    STG 1  --  patient to be independent with HEP for assist in mobility and release of tightened muscles.   -    STG 1 Progress  --  New  -    STG 2  --  patient to present with improved position to sacrum and pelvis such that MET not required for alignment.   -    STG 2 Progress  --  New  -    STG 3  --  patient to report improved pain to mild on 5/7 days without inc pain noted with mobility tasks or at least managed without spikes of pain impairing function  -[facility] 3 Progress  --  New  -       Long Term Goals    LTG Date to Achieve  --  -- deferred due to delivery in 2 weeks.   -       Time Calculation    PT Goal Re-Cert Due Date  11/19/20  -  11/19/20  -      User Key  (r) = Recorded By, (t) = Taken By, (c) = Cosigned By    Initials Name Provider Type    Alisa Macario, PT DPT Physical Therapist          Therapy Education  Given: HEP  Program: New  How Provided: Verbal, Written, Demonstration  Provided to: Patient  Level of Understanding: Teach back education performed, Verbalized, Demonstrated               Time Calculation:   Start Time: 1400  Stop Time: 1505  Time Calculation (min): 65 min  Therapy Charges for Today     Code Description Service Date Service Provider Modifiers Qty    25388780835  PT EVAL MOD COMPLEXITY 3 10/19/2020 Alisa Ulloa, PT DPT GP 1    [id number]  PT MANUAL THERAPY EA 15 MIN 10/19/2020 Alisa Ulloa PT DPT GP 1                  Alisa Ulloa PT DPT  10/19/2020

## 2020-10-20 ENCOUNTER — HOSPITAL ENCOUNTER (INPATIENT)
Facility: HOSPITAL | Age: 20
LOS: 1 days | Discharge: HOME | End: 2020-10-21
Attending: OBSTETRICS & GYNECOLOGY | Admitting: OBSTETRICS & GYNECOLOGY

## 2020-10-20 PROBLEM — Z37.9 NORMAL LABOR: Status: ACTIVE | Noted: 2020-10-20

## 2020-10-20 LAB
ABO GROUP BLD: NORMAL
AMPHET+METHAMPHET UR QL: NEGATIVE
AMPHETAMINES UR QL: NEGATIVE
BARBITURATES UR QL SCN: NEGATIVE
BENZODIAZ UR QL SCN: NEGATIVE
BLD GP AB SCN SERPL QL: NEGATIVE
BUPRENORPHINE SERPL-MCNC: NEGATIVE NG/ML
CANNABINOIDS SERPL QL: NEGATIVE
COCAINE UR QL: NEGATIVE
DEPRECATED RDW RBC AUTO: 43.8 FL (ref 37–54)
ERYTHROCYTE [DISTWIDTH] IN BLOOD BY AUTOMATED COUNT: 15.1 % (ref 12.3–15.4)
HCT VFR BLD AUTO: 24.6 % (ref 34–46.6)
HGB BLD-MCNC: 7.9 G/DL (ref 12–15.9)
Lab: NORMAL
MCH RBC QN AUTO: 25.9 PG (ref 26.6–33)
MCHC RBC AUTO-ENTMCNC: 32.1 G/DL (ref 31.5–35.7)
MCV RBC AUTO: 80.7 FL (ref 79–97)
METHADONE UR QL SCN: NEGATIVE
OPIATES UR QL: NEGATIVE
OXYCODONE UR QL SCN: NEGATIVE
PCP UR QL SCN: NEGATIVE
PLATELET # BLD AUTO: 232 10*3/MM3 (ref 140–450)
PMV BLD AUTO: 10.7 FL (ref 6–12)
PROPOXYPH UR QL: NEGATIVE
RBC # BLD AUTO: 3.05 10*6/MM3 (ref 3.77–5.28)
RH BLD: POSITIVE
T&S EXPIRATION DATE: NORMAL
TRICYCLICS UR QL SCN: NEGATIVE
WBC # BLD AUTO: 9.61 10*3/MM3 (ref 3.4–10.8)

## 2020-10-20 PROCEDURE — 25010000002 HYDROMORPHONE 1 MG/ML SOLUTION: Performed by: OBSTETRICS & GYNECOLOGY

## 2020-10-20 PROCEDURE — 36415 COLL VENOUS BLD VENIPUNCTURE: CPT | Performed by: OBSTETRICS & GYNECOLOGY

## 2020-10-20 PROCEDURE — 59410 OBSTETRICAL CARE: CPT | Performed by: OBSTETRICS & GYNECOLOGY

## 2020-10-20 PROCEDURE — 25010000002 BUTORPHANOL PER 1 MG: Performed by: OBSTETRICS & GYNECOLOGY

## 2020-10-20 PROCEDURE — 86901 BLOOD TYPING SEROLOGIC RH(D): CPT | Performed by: OBSTETRICS & GYNECOLOGY

## 2020-10-20 PROCEDURE — 25010000002 KETOROLAC TROMETHAMINE PER 15 MG: Performed by: OBSTETRICS & GYNECOLOGY

## 2020-10-20 PROCEDURE — 86900 BLOOD TYPING SEROLOGIC ABO: CPT | Performed by: OBSTETRICS & GYNECOLOGY

## 2020-10-20 PROCEDURE — 80306 DRUG TEST PRSMV INSTRMNT: CPT | Performed by: OBSTETRICS & GYNECOLOGY

## 2020-10-20 PROCEDURE — 85027 COMPLETE CBC AUTOMATED: CPT | Performed by: OBSTETRICS & GYNECOLOGY

## 2020-10-20 PROCEDURE — 86850 RBC ANTIBODY SCREEN: CPT | Performed by: OBSTETRICS & GYNECOLOGY

## 2020-10-20 RX ORDER — HYDROCODONE BITARTRATE AND ACETAMINOPHEN 5; 325 MG/1; MG/1
1 TABLET ORAL EVERY 6 HOURS PRN
Status: DISCONTINUED | OUTPATIENT
Start: 2020-10-20 | End: 2020-10-21 | Stop reason: HOSPADM

## 2020-10-20 RX ORDER — ACETAMINOPHEN 500 MG
1000 TABLET ORAL EVERY 6 HOURS
Status: DISCONTINUED | OUTPATIENT
Start: 2020-10-20 | End: 2020-10-21 | Stop reason: HOSPADM

## 2020-10-20 RX ORDER — SODIUM CHLORIDE, SODIUM LACTATE, POTASSIUM CHLORIDE, CALCIUM CHLORIDE 600; 310; 30; 20 MG/100ML; MG/100ML; MG/100ML; MG/100ML
125 INJECTION, SOLUTION INTRAVENOUS CONTINUOUS
Status: DISCONTINUED | OUTPATIENT
Start: 2020-10-20 | End: 2020-10-20

## 2020-10-20 RX ORDER — SODIUM CHLORIDE 0.9 % (FLUSH) 0.9 %
10 SYRINGE (ML) INJECTION AS NEEDED
Status: DISCONTINUED | OUTPATIENT
Start: 2020-10-20 | End: 2020-10-20

## 2020-10-20 RX ORDER — METHYLERGONOVINE MALEATE 0.2 MG/ML
200 INJECTION INTRAVENOUS ONCE AS NEEDED
Status: DISCONTINUED | OUTPATIENT
Start: 2020-10-20 | End: 2020-10-20 | Stop reason: HOSPADM

## 2020-10-20 RX ORDER — FERROUS SULFATE TAB EC 324 MG (65 MG FE EQUIVALENT) 324 (65 FE) MG
324 TABLET DELAYED RESPONSE ORAL 2 TIMES DAILY WITH MEALS
Status: DISCONTINUED | OUTPATIENT
Start: 2020-10-20 | End: 2020-10-21 | Stop reason: HOSPADM

## 2020-10-20 RX ORDER — LANOLIN 100 %
OINTMENT (GRAM) TOPICAL
Status: DISCONTINUED | OUTPATIENT
Start: 2020-10-20 | End: 2020-10-21 | Stop reason: HOSPADM

## 2020-10-20 RX ORDER — CALCIUM CARBONATE 200(500)MG
2 TABLET,CHEWABLE ORAL 3 TIMES DAILY PRN
Status: DISCONTINUED | OUTPATIENT
Start: 2020-10-20 | End: 2020-10-21 | Stop reason: HOSPADM

## 2020-10-20 RX ORDER — IBUPROFEN 800 MG/1
800 TABLET ORAL EVERY 8 HOURS
Status: DISCONTINUED | OUTPATIENT
Start: 2020-10-20 | End: 2020-10-21 | Stop reason: HOSPADM

## 2020-10-20 RX ORDER — DOCUSATE SODIUM 100 MG/1
100 CAPSULE, LIQUID FILLED ORAL 2 TIMES DAILY
Status: DISCONTINUED | OUTPATIENT
Start: 2020-10-20 | End: 2020-10-21 | Stop reason: HOSPADM

## 2020-10-20 RX ORDER — PRENATAL VIT/IRON FUM/FOLIC AC 27MG-0.8MG
1 TABLET ORAL DAILY
Status: DISCONTINUED | OUTPATIENT
Start: 2020-10-20 | End: 2020-10-21 | Stop reason: HOSPADM

## 2020-10-20 RX ORDER — SODIUM CHLORIDE 0.9 % (FLUSH) 0.9 %
1-10 SYRINGE (ML) INJECTION AS NEEDED
Status: DISCONTINUED | OUTPATIENT
Start: 2020-10-20 | End: 2020-10-21 | Stop reason: HOSPADM

## 2020-10-20 RX ORDER — CARBOPROST TROMETHAMINE 250 UG/ML
250 INJECTION, SOLUTION INTRAMUSCULAR AS NEEDED
Status: DISCONTINUED | OUTPATIENT
Start: 2020-10-20 | End: 2020-10-20 | Stop reason: HOSPADM

## 2020-10-20 RX ORDER — OXYTOCIN/0.9 % SODIUM CHLORIDE 30/500 ML
PLASTIC BAG, INJECTION (ML) INTRAVENOUS
Status: DISPENSED
Start: 2020-10-20 | End: 2020-10-20

## 2020-10-20 RX ORDER — HYDROCORTISONE 25 MG/G
1 CREAM TOPICAL AS NEEDED
Status: DISCONTINUED | OUTPATIENT
Start: 2020-10-20 | End: 2020-10-21 | Stop reason: HOSPADM

## 2020-10-20 RX ORDER — OXYTOCIN/0.9 % SODIUM CHLORIDE 30/500 ML
85 PLASTIC BAG, INJECTION (ML) INTRAVENOUS ONCE
Status: DISCONTINUED | OUTPATIENT
Start: 2020-10-20 | End: 2020-10-21 | Stop reason: HOSPADM

## 2020-10-20 RX ORDER — ONDANSETRON 4 MG/1
4 TABLET, FILM COATED ORAL EVERY 6 HOURS PRN
Status: DISCONTINUED | OUTPATIENT
Start: 2020-10-20 | End: 2020-10-21 | Stop reason: HOSPADM

## 2020-10-20 RX ORDER — OXYTOCIN/0.9 % SODIUM CHLORIDE 30/500 ML
650 PLASTIC BAG, INJECTION (ML) INTRAVENOUS ONCE
Status: COMPLETED | OUTPATIENT
Start: 2020-10-20 | End: 2020-10-20

## 2020-10-20 RX ORDER — MISOPROSTOL 200 UG/1
800 TABLET ORAL AS NEEDED
Status: DISCONTINUED | OUTPATIENT
Start: 2020-10-20 | End: 2020-10-20 | Stop reason: HOSPADM

## 2020-10-20 RX ORDER — ONDANSETRON 2 MG/ML
4 INJECTION INTRAMUSCULAR; INTRAVENOUS EVERY 6 HOURS PRN
Status: DISCONTINUED | OUTPATIENT
Start: 2020-10-20 | End: 2020-10-21 | Stop reason: HOSPADM

## 2020-10-20 RX ORDER — KETOROLAC TROMETHAMINE 30 MG/ML
30 INJECTION, SOLUTION INTRAMUSCULAR; INTRAVENOUS ONCE
Status: COMPLETED | OUTPATIENT
Start: 2020-10-20 | End: 2020-10-20

## 2020-10-20 RX ORDER — BISACODYL 10 MG
10 SUPPOSITORY, RECTAL RECTAL DAILY PRN
Status: DISCONTINUED | OUTPATIENT
Start: 2020-10-21 | End: 2020-10-21 | Stop reason: HOSPADM

## 2020-10-20 RX ADMIN — HYDROCODONE BITARTRATE AND ACETAMINOPHEN 1 TABLET: 5; 325 TABLET ORAL at 20:03

## 2020-10-20 RX ADMIN — KETOROLAC TROMETHAMINE 30 MG: 30 INJECTION, SOLUTION INTRAMUSCULAR; INTRAVENOUS at 04:08

## 2020-10-20 RX ADMIN — DOCUSATE SODIUM 100 MG: 100 CAPSULE, LIQUID FILLED ORAL at 20:03

## 2020-10-20 RX ADMIN — DOCUSATE SODIUM 100 MG: 100 CAPSULE, LIQUID FILLED ORAL at 09:19

## 2020-10-20 RX ADMIN — BUTORPHANOL TARTRATE 2 MG: 2 INJECTION, SOLUTION INTRAMUSCULAR; INTRAVENOUS at 02:38

## 2020-10-20 RX ADMIN — SODIUM CHLORIDE, POTASSIUM CHLORIDE, SODIUM LACTATE AND CALCIUM CHLORIDE 125 ML/HR: 600; 310; 30; 20 INJECTION, SOLUTION INTRAVENOUS at 02:06

## 2020-10-20 RX ADMIN — ACETAMINOPHEN 1000 MG: 500 TABLET ORAL at 06:35

## 2020-10-20 RX ADMIN — IBUPROFEN 800 MG: 800 TABLET, FILM COATED ORAL at 20:33

## 2020-10-20 RX ADMIN — ACETAMINOPHEN 1000 MG: 500 TABLET ORAL at 17:23

## 2020-10-20 RX ADMIN — FERROUS SULFATE TAB EC 324 MG (65 MG FE EQUIVALENT) 324 MG: 324 (65 FE) TABLET DELAYED RESPONSE at 17:23

## 2020-10-20 RX ADMIN — FERROUS SULFATE TAB EC 324 MG (65 MG FE EQUIVALENT) 324 MG: 324 (65 FE) TABLET DELAYED RESPONSE at 09:18

## 2020-10-20 RX ADMIN — ACETAMINOPHEN 1000 MG: 500 TABLET ORAL at 10:30

## 2020-10-20 RX ADMIN — PRENATAL VIT W/ FE FUMARATE-FA TAB 27-0.8 MG 1 TABLET: 27-0.8 TAB at 09:18

## 2020-10-20 RX ADMIN — IBUPROFEN 800 MG: 800 TABLET, FILM COATED ORAL at 13:34

## 2020-10-20 RX ADMIN — HYDROMORPHONE HYDROCHLORIDE 0.25 MG: 1 INJECTION, SOLUTION INTRAMUSCULAR; INTRAVENOUS; SUBCUTANEOUS at 04:10

## 2020-10-20 RX ADMIN — OXYTOCIN-SODIUM CHLORIDE 0.9% IV SOLN 30 UNIT/500ML 650 ML/HR: 30-0.9/5 SOLUTION at 03:45

## 2020-10-21 VITALS
BODY MASS INDEX: 23.74 KG/M2 | HEIGHT: 63 IN | DIASTOLIC BLOOD PRESSURE: 52 MMHG | TEMPERATURE: 99.1 F | WEIGHT: 134 LBS | RESPIRATION RATE: 16 BRPM | HEART RATE: 80 BPM | SYSTOLIC BLOOD PRESSURE: 107 MMHG | OXYGEN SATURATION: 94 %

## 2020-10-21 PROCEDURE — 0503F POSTPARTUM CARE VISIT: CPT | Performed by: OBSTETRICS & GYNECOLOGY

## 2020-10-21 RX ORDER — ACETAMINOPHEN 500 MG
1000 TABLET ORAL EVERY 6 HOURS PRN
Qty: 30 TABLET | Refills: 0 | OUTPATIENT
Start: 2020-10-21 | End: 2021-06-28

## 2020-10-21 RX ORDER — IBUPROFEN 800 MG/1
800 TABLET ORAL EVERY 8 HOURS PRN
Qty: 30 TABLET | Refills: 0 | OUTPATIENT
Start: 2020-10-21 | End: 2021-06-28

## 2020-10-21 RX ADMIN — PRENATAL VIT W/ FE FUMARATE-FA TAB 27-0.8 MG 1 TABLET: 27-0.8 TAB at 09:33

## 2020-10-21 RX ADMIN — IBUPROFEN 800 MG: 800 TABLET, FILM COATED ORAL at 05:09

## 2020-10-21 RX ADMIN — FERROUS SULFATE TAB EC 324 MG (65 MG FE EQUIVALENT) 324 MG: 324 (65 FE) TABLET DELAYED RESPONSE at 09:34

## 2020-10-21 RX ADMIN — DOCUSATE SODIUM 100 MG: 100 CAPSULE, LIQUID FILLED ORAL at 09:33

## 2020-10-28 ENCOUNTER — TELEPHONE (OUTPATIENT)
Dept: LACTATION | Facility: HOSPITAL | Age: 20
End: 2020-10-28

## 2020-11-03 ENCOUNTER — POSTPARTUM VISIT (OUTPATIENT)
Dept: OBSTETRICS AND GYNECOLOGY | Facility: CLINIC | Age: 20
End: 2020-11-03

## 2020-11-03 VITALS — BODY MASS INDEX: 21.33 KG/M2 | WEIGHT: 120.4 LBS | DIASTOLIC BLOOD PRESSURE: 62 MMHG | SYSTOLIC BLOOD PRESSURE: 110 MMHG

## 2020-11-03 DIAGNOSIS — Z30.013 ENCOUNTER FOR INITIAL PRESCRIPTION OF INJECTABLE CONTRACEPTIVE: Primary | ICD-10-CM

## 2020-11-03 DIAGNOSIS — O09.30 LATE PRENATAL CARE, ANTEPARTUM: ICD-10-CM

## 2020-11-03 DIAGNOSIS — Z34.80 SUPERVISION OF OTHER NORMAL PREGNANCY: ICD-10-CM

## 2020-11-03 DIAGNOSIS — Z37.9 NORMAL LABOR: ICD-10-CM

## 2020-11-03 PROCEDURE — 0503F POSTPARTUM CARE VISIT: CPT | Performed by: FAMILY MEDICINE

## 2020-11-03 RX ORDER — MEDROXYPROGESTERONE ACETATE 150 MG/ML
150 INJECTION, SUSPENSION INTRAMUSCULAR
Qty: 1 EACH | Refills: 4 | OUTPATIENT
Start: 2020-12-04 | End: 2022-02-25

## 2020-11-03 NOTE — PROGRESS NOTES
2 week postpartum visit      Arik Fuentes is a 20 y.o.  s/p  on 10/20/20, who presents today for a 2 week postpartum check.  The patient states things are going well, she has noted she has been crying more than usual.  Reports no issues bonding with her baby or caring for her baby.  Currently does not desire counseling or pharmacotherapy for postpartum depression symptoms.   Menstrual cycles have not resumed.  Breast feeding.  Desires Depo-provera for contraception.  She has not resumed sexual intercourse.  Denies bowel or bladder issues.    PHYSICAL EXAM:    /62 (BP Location: Left arm, Patient Position: Sitting, Cuff Size: Adult)   Wt 54.6 kg (120 lb 6.4 oz)   LMP  (LMP Unknown)   BMI 21.33 kg/m²   Breast Exam: Lactating, no masses, skin dimpling, nipple retraction, or nipple discharge bilaterally.  Abdomen: +BS, benign, no masses, soft, non-tender.  Extremities: No deep calf tenderness.  Postpartum Depression Screening Questionnaire: 6 , does not desire intervention.    IMPRESSION/PLAN:  20 y.o.  s/p , 2 weeks postpartum.         Diagnosis Plan   1. Encounter for initial prescription of injectable contraceptive  medroxyPROGESTERone (Depo-Provera) 150 MG/ML injection   2. Normal labor     3. Single liveborn infant delivered vaginally     4. Late prenatal care, antepartum     5. Supervision of other normal pregnancy         - Recovering nicely from her delivery  - Continue pelvic rest  - Contraception: DEPO - will administer at 6wk pp visit  - Return in 4wks for 6wk postpartum visit        This document has been electronically signed by Marsha Randall MD on November 3, 2020 15:26 CST

## 2020-12-21 ENCOUNTER — CLINICAL SUPPORT (OUTPATIENT)
Dept: OBSTETRICS AND GYNECOLOGY | Facility: CLINIC | Age: 20
End: 2020-12-21

## 2020-12-21 DIAGNOSIS — Z30.013 ENCOUNTER FOR INITIAL PRESCRIPTION OF INJECTABLE CONTRACEPTIVE: Primary | ICD-10-CM

## 2020-12-21 PROCEDURE — 96372 THER/PROPH/DIAG INJ SC/IM: CPT | Performed by: FAMILY MEDICINE

## 2020-12-21 RX ORDER — MEDROXYPROGESTERONE ACETATE 150 MG/ML
150 INJECTION, SUSPENSION INTRAMUSCULAR ONCE
Status: COMPLETED | OUTPATIENT
Start: 2020-12-21 | End: 2020-12-21

## 2020-12-21 RX ADMIN — MEDROXYPROGESTERONE ACETATE 150 MG: 150 INJECTION, SUSPENSION INTRAMUSCULAR at 10:42

## 2021-03-09 ENCOUNTER — CLINICAL SUPPORT (OUTPATIENT)
Dept: OBSTETRICS AND GYNECOLOGY | Facility: CLINIC | Age: 21
End: 2021-03-09

## 2021-03-09 DIAGNOSIS — Z30.42 ENCOUNTER FOR MANAGEMENT AND INJECTION OF DEPO-PROVERA: Primary | ICD-10-CM

## 2021-03-09 PROCEDURE — 96372 THER/PROPH/DIAG INJ SC/IM: CPT | Performed by: NURSE PRACTITIONER

## 2021-03-09 RX ORDER — MEDROXYPROGESTERONE ACETATE 150 MG/ML
150 INJECTION, SUSPENSION INTRAMUSCULAR ONCE
Status: COMPLETED | OUTPATIENT
Start: 2021-03-09 | End: 2021-03-09

## 2021-03-09 RX ADMIN — MEDROXYPROGESTERONE ACETATE 150 MG: 150 INJECTION, SUSPENSION INTRAMUSCULAR at 10:47

## 2021-06-28 PROCEDURE — 87635 SARS-COV-2 COVID-19 AMP PRB: CPT | Performed by: NURSE PRACTITIONER

## 2021-07-20 DIAGNOSIS — Z30.013 INITIATION OF DEPO PROVERA: Primary | ICD-10-CM

## 2021-09-14 PROCEDURE — U0004 COV-19 TEST NON-CDC HGH THRU: HCPCS | Performed by: NURSE PRACTITIONER

## 2021-11-10 ENCOUNTER — HOSPITAL ENCOUNTER (OUTPATIENT)
Dept: PHYSICAL THERAPY | Facility: HOSPITAL | Age: 21
Setting detail: THERAPIES SERIES
Discharge: HOME | End: 2021-11-10

## 2021-11-10 DIAGNOSIS — M54.9 BACK PAIN, UNSPECIFIED BACK LOCATION, UNSPECIFIED BACK PAIN LATERALITY, UNSPECIFIED CHRONICITY: ICD-10-CM

## 2021-11-10 DIAGNOSIS — T14.8XXA MUSCLE STRAIN: Primary | ICD-10-CM

## 2021-11-10 PROCEDURE — 97161 PT EVAL LOW COMPLEX 20 MIN: CPT

## 2021-11-10 NOTE — THERAPY EVALUATION
Outpatient Physical Therapy Ortho Initial Evaluation  [facility]     Patient Name: Arik Fuentes  : 2000  MRN: [id number]  Today's Date: 11/10/2021      Visit Date: 11/10/2021     ATTENDANCE:   SUBJECTIVE IMPROVEMENT: not assessed at initial evaluation  NEXT MD APPOINTMENT: PRN  RECERT DATE: 2021    THERAPY DIAGNOSIS: mid back pain, muscle strain       Patient Active Problem List   Diagnosis   • Late prenatal care, antepartum   • Supervision of other normal pregnancy   • Normal labor   • Single liveborn infant delivered vaginally        Past Medical History:   Diagnosis Date   • Allergic rhinitis    • Atopic dermatitis    • Chondromalacia of patella    • Sleep apnea         Past Surgical History:   Procedure Laterality Date   • TONSILLECTOMY AND ADENOIDECTOMY  2005    obstructive sleep apnea syndrome       Visit Dx:     ICD-10-CM ICD-9-CM   1. Muscle strain  T14.8XXA 848.9   2. Back pain, unspecified back location, unspecified back pain laterality, unspecified chronicity  M54.9 724.5          Patient History     Row Name 11/10/21 1300             History    Chief Complaint Pain  -AC      Type of Pain Back pain  -AC      Brief Description of Current Complaint Pt notes that pulled something she thinks on her side, running from under left armpit down to hip and in her low back. ~ 2 weeks ago DOI. Notes she currently works for [employer] and does a lot of heavy lifting. Notes she was lifting a box and felt a sharp pain in her side however it seemed to get better as she continued to work thoughout the day however began increaseing over the past 2 weeks and is not pretty constant. Reports she went to urgent care last tuesday and she was perscribed muscle relaxers. Notes currently still working, MD did not give any work restricitions and told pt that PT would be able to issue restrictions.  -AC      Patient/Caregiver Goals Relieve pain; Return to prior level of function  -AC      Current  Tobacco Use no  -AC      Patient's Rating of General Health Excellent  -AC      Hand Dominance right-handed  -AC      Occupation/sports/leisure activities [employer]  -      What clinical tests have you had for this problem? --  none completed this date.  -AC              Pain     Pain Location Back  -AC      Pain at Present 7  -AC      Pain at Best 6  -AC      Pain at Worst 10  -AC      What Performance Factors Make the Current Problem(s) WORSE? 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  -AC      What Performance Factors Make the Current Problem(s) BETTER? laying down to rest.  -AC      Is your sleep disturbed? Yes  -AC      Difficulties at work? yes- still working currently.  -AC      Difficulties with ADL's? yes- anything she has bend to lower body dress/ bathe increases pain.  -AC              Fall Risk Assessment    Any falls in the past year: No  -AC              Daily Activities    Primary Language English  -AC            User Key  (r) = Recorded By, (t) = Taken By, (c) = Cosigned By    Initials Name Provider Type    AC Alysa Gannon, PT Physical Therapist                 PT Ortho     Row Name 11/10/21 1300       Subjective Comments    Subjective Comments see pt hx.  -AC       Precautions and Contraindications    Precautions/Limitations no known precautions/limitations  -AC       Subjective Pain    Able to rate subjective pain? yes  -AC    Pre-Treatment Pain Level 6  -AC       Posture/Observations    Posture/Observations Comments forward head and rounded shoulders posture. palpation tenderness along the L lats, teres major, QL and ITB. Pain with palpation to L greater trochanter as well.  -AC       General ROM    Head/Neck/Trunk Trunk Extension; Trunk Flexion; Trunk Lt Lateral Flexion; Trunk Rt Lateral Flexion; Trunk Lt Rotation; Trunk Rt Rotation; Comments  -AC    GENERAL ROM COMMENTS L shoulder flexion/abduction limited due to pain  -AC       Head/Neck/Trunk    Trunk Extension AROM WNLs  -AC    Trunk Flexion AROM WNLs-  increased L side body pain  -AC    Trunk Lt Lateral Flexion AROM tips to joint line- L side pain  -AC    Trunk Rt Lateral Flexion AROM tips to superior patella- L side pain  -AC    Trunk Lt Rotation AROM WNLs  -AC    Trunk Rt Rotation AROM limited 25%- L side pain  -AC       MMT (Manual Muscle Testing)    Rt Upper Ext Comments  -AC    Lt Upper Ext Lt Shoulder Flexion; Lt Shoulder Extension; Lt Shoulder ABduction; Lt Shoulder ADduction; Lt Shoulder Internal Rotation; Lt Shoulder External Rotation; Lt Elbow Extension; Lt Elbow Flexion  -AC    General MMT Comments BLEs groslly 5/5 without increased pain  -AC       MMT Right Upper Ext    Rt Upper Extremity Comments  grossly 4+/5  -AC       MMT Left Upper Ext    Lt Shoulder Flexion MMT, Gross Movement (4-/5) good minus  -AC    Lt Shoulder Extension MMT, Gross Movement (4/5) good  -AC    Lt Shoulder ABduction MMT, Gross Movement (4-/5) good minus  -AC    Lt Shoulder ADduction MMT, Gross Movement (4/5) good  -AC    Lt Shoulder Internal Rotation MMT, Gross Movement (4/5) good  -AC    Lt Shoulder External Rotation MMT, Gross Movement (4-/5) good minus  -AC    Lt Elbow Flexion MMT, Gross Movement (4+/5) good plus  -AC    Lt Elbow Extension MMT, Gross Movement (4+/5) good plus  -AC    Lt Upper Extremity Comments  increased pain with flexion, abd, and IR/ER  -AC       Sensation    Additional Comments no report of numbness or tingling.  -AC          User Key  (r) = Recorded By, (t) = Taken By, (c) = Cosigned By    Initials Name Provider Type    AC Alysa Gannon, PT Physical Therapist                            Therapy Education  Education Details: stretching- QL, LTR, wall lean stretch, mdi back stretch, and mid back with rotation. edu for heat at home.  Given: HEP  Program: New  How Provided: Verbal, Demonstration, Written  Provided to: Patient  Level of Understanding: Verbalized, Demonstrated      PT OP Goals     Row Name 11/10/21 1400          PT Short Term Goals    STG  Date to Achieve 12/08/21  -AC     STG 1 Pt is indpt with HEP.  -AC     STG 1 Progress New  -AC     STG 2 Pt is able to complete AROM L shoulder flexion/abduction WNLs without pain.,  -AC     STG 2 Progress New  -AC     STG 3 Pt is able to complete Trunk ROM WNLs without increased pain.  -AC     STG 3 Progress New  -AC     STG 4 Pt demos BUE strength 4+/5 wihtout pain increase.  -AC     STG 4 Progress New  -AC     STG 5 Pt demo's proper lifting mechanics for work activities to avoid recurrance of injury.  -AC     STG 5 Progress New  -AC            Time Calculation    PT Goal Re-Cert Due Date 12/01/21  -           User Key  (r) = Recorded By, (t) = Taken By, (c) = Cosigned By    Initials Name Provider Type    AC Alysa Gannon, PT Physical Therapist                 PT Assessment/Plan     Row Name 11/10/21 1400          PT Assessment    Functional Limitations Performance in work activities; Performance in self-care ADL; Performance in leisure activities; Limitation in home management  -     Impairments Impaired flexibility; Pain; Muscle strength; Posture  -AC     Assessment Comments The pt is a 22 y/o female who presents today with a L mid back strain, possibly QL/lats. she presents today with L side body pain with all turnk and LUE shoulder flexion/abduction ROM activities today. She has decreased LUE strength due to pain. forward head and rounded shoulders posture thoruhgout evaluation. palpation tenderness along QL, Lats, teres major, and TFL. Pt educated for gentle mid and low back stretching and to increase use of heat at home to help with pain. Pt will benefit from skilled PT to imporvetrunk and UE strength andmobility to return to PLOF with minimal pain increase.  -AC     Rehab Potential Good  -AC     Patient/caregiver participated in establishment of treatment plan and goals Yes  -AC     Patient would benefit from skilled therapy intervention Yes  -AC            PT Plan    PT Frequency 2x/week  -AC      Predicted Duration of Therapy Intervention (PT) 3-4 weeks with more TBD  -AC     PT Plan Comments trunk and UE stretching/strength. manual/modalites for pain control. review lifting mechanics for work.  -AC           User Key  (r) = Recorded By, (t) = Taken By, (c) = Cosigned By    Initials Name Provider Type    AC Alysa Gannon, PT Physical Therapist                                    Outcome Measure Options: Modifed Owestremi  Modified Oswestry  Modified Oswestry Score/Comments: 9/50; 18%      Time Calculation:     Start Time: 1302  Stop Time: 1345  Time Calculation (min): 43 min  Untimed Charges  PT Eval/Re-eval Minutes: 43  Total Minutes  Untimed Charges Total Minutes: 43   Total Minutes: 43     Therapy Charges for Today     Code Description Service Date Service Provider Modifiers Qty    [id number] HC PT EVAL LOW COMPLEXITY 3 11/10/2021 Alysa Gannon, PT GP 1                   Alysa Gannon, PT  11/10/2021

## 2021-11-15 ENCOUNTER — APPOINTMENT (OUTPATIENT)
Dept: PHYSICAL THERAPY | Facility: HOSPITAL | Age: 21
End: 2021-11-15

## 2021-11-19 ENCOUNTER — HOSPITAL ENCOUNTER (OUTPATIENT)
Dept: PHYSICAL THERAPY | Facility: HOSPITAL | Age: 21
Setting detail: THERAPIES SERIES
Discharge: HOME | End: 2021-11-19

## 2021-11-19 DIAGNOSIS — M54.9 BACK PAIN, UNSPECIFIED BACK LOCATION, UNSPECIFIED BACK PAIN LATERALITY, UNSPECIFIED CHRONICITY: ICD-10-CM

## 2021-11-19 DIAGNOSIS — T14.8XXA MUSCLE STRAIN: Primary | ICD-10-CM

## 2021-11-19 PROCEDURE — 97110 THERAPEUTIC EXERCISES: CPT

## 2021-11-19 NOTE — THERAPY TREATMENT NOTE
Outpatient Physical Therapy Ortho Treatment Note  [facility]     Patient Name: Arik Fuentes  : 2000  MRN: [id number]  Today's Date: 2021      Visit Date: 2021  Subjective Improvement: n/a  MD visit: JOSR  Visit Number:   Total Approved:20 a year  Recert Date: 21  Visit Dx:    ICD-10-CM ICD-9-CM   1. Muscle strain  T14.8XXA 848.9   2. Back pain, unspecified back location, unspecified back pain laterality, unspecified chronicity  M54.9 724.5       Patient Active Problem List   Diagnosis   • Late prenatal care, antepartum   • Supervision of other normal pregnancy   • Normal labor   • Single liveborn infant delivered vaginally        Past Medical History:   Diagnosis Date   • Allergic rhinitis    • Atopic dermatitis    • Chondromalacia of patella    • Sleep apnea         Past Surgical History:   Procedure Laterality Date   • TONSILLECTOMY AND ADENOIDECTOMY  2005    obstructive sleep apnea syndrome        PT Ortho     Row Name 21 0800       Subjective Comments    Subjective Comments Pt reports that she did not take pain medication  -TL       Precautions and Contraindications    Precautions/Limitations no known precautions/limitations  -TL       Subjective Pain    Able to rate subjective pain? yes  -TL    Pre-Treatment Pain Level 8  -TL    Post-Treatment Pain Level 5  -TL          User Key  (r) = Recorded By, (t) = Taken By, (c) = Cosigned By    Initials Name Provider Type    Kateryna Hernandez PTA Physical Therapy Assistant                             PT Assessment/Plan     Row Name 21 0800          PT Assessment    Assessment Comments pt came in with High pain rating. Pt reported that she has not taken anything for pain this morning. PTA observes pt to be guarded in left rib area. Pt tolerated new stretches and strengthening ex well. Pt did agree to estim with moist heat after ex. Treatment focused on UE and Core stretches and strengthening. No new goals  met at this time. Provided pt with HEP . See educational tab. PTA also went over lifting mechanics by verbal and demonstration.  -TL            PT Plan    PT Frequency 2x/week  -TL     Predicted Duration of Therapy Intervention (PT) 3-4 weeks  -TL     PT Plan Comments Do manual next visit to QL  -TL           User Key  (r) = Recorded By, (t) = Taken By, (c) = Cosigned By    Initials Name Provider Type    Kateryna Hernandez PTA Physical Therapy Assistant                 Modalities     Row Name 11/19/21 0800             Moist Heat    MH Applied Yes  -TL      Location low bacj  -TL      MH Prior to Rx Yes  10 mins  -TL      MH S/P Rx Yes  10 mins with estim  -TL              ELECTRICAL STIMULATION    Attended/Unattended Unattended  -TL      Stimulation Type IFC  -TL      Location/Electrode Placement/Other low back/mid back  -TL      PT E-Stim Unattended Minutes 10  -TL            User Key  (r) = Recorded By, (t) = Taken By, (c) = Cosigned By    Initials Name Provider Type    Kateryna Hernandez PTA Physical Therapy Assistant               OP Exercises     Row Name 11/19/21 0800             Subjective Comments    Subjective Comments Pt reports that she did not take pain medication  -TL              Subjective Pain    Able to rate subjective pain? yes  -TL      Pre-Treatment Pain Level 8  -TL      Post-Treatment Pain Level 5  -TL              Total Minutes    84482 - PT Therapeutic Exercise Minutes 47  -TL              Exercise 1    Exercise Name 1 MHP with patient ed  -TL      Time 1 10  -TL      Additional Comments on lifting mechanics  -TL              Exercise 2    Exercise Name 2 QL S  -TL      Sets 2 3  -TL      Time 2 30 sec hold  -TL      Additional Comments review  -TL              Exercise 3    Exercise Name 3 LTR  -TL      Sets 3 10  -TL      Reps 3 10  -TL      Additional Comments review  -TL              Exercise 4    Exercise Name 4 open book S both side  -TL      Sets 4 10  -TL      Reps 4 10  -TL               Exercise 5    Exercise Name 5 bridges  -TL      Sets 5 2  -TL      Reps 5 10  -TL      Time 5 5 sec hold  -TL              Exercise 6    Exercise Name 6 pulleys flexion/abd  -TL      Sets 6 20  -TL      Time 6 5 sec hold  -TL      Additional Comments --  -TL              Exercise 7    Exercise Name 7 shld rows RTB mid  -TL      Sets 7 1  -TL      Reps 7 10  -TL      Time 7 5 sec hold  -TL              Exercise 8    Exercise Name 8 see modalities  -TL            User Key  (r) = Recorded By, (t) = Taken By, (c) = Cosigned By    Initials Name Provider Type    Kateryna Hernandez PTA Physical Therapy Assistant                              PT OP Goals     Row Name 11/19/21 0800          PT Short Term Goals    STG Date to Achieve 12/08/21  -TL     STG 1 Pt is indpt with HEP.  -TL     STG 1 Progress Ongoing  -TL     STG 2 Pt is able to complete AROM L shoulder flexion/abduction WNLs without pain.,  -TL     STG 2 Progress Ongoing  -TL     STG 3 Pt is able to complete Trunk ROM WNLs without increased pain.  -TL     STG 3 Progress Ongoing  -TL     STG 4 Pt demos BUE strength 4+/5 wihtout pain increase.  -TL     STG 4 Progress Ongoing  -TL     STG 5 Pt demo's proper lifting mechanics for work activities to avoid recurrance of injury.  -TL     STG 5 Progress Ongoing  -TL            Time Calculation    PT Goal Re-Cert Due Date 12/01/21  -TL           User Key  (r) = Recorded By, (t) = Taken By, (c) = Cosigned By    Initials Name Provider Type    Kateryna Hernandez PTA Physical Therapy Assistant                Therapy Education  Education Details: open book S, DKTC S, pura, hiram rows mid/high, education on lifting mechanics for work this date.  Given: HEP, Symptoms/condition management, Pain management, Posture/body mechanics  Program: New, Reinforced  How Provided: Verbal, Demonstration, Written  Provided to: Patient  Level of Understanding: Teach back education performed, Verbalized, Demonstrated              Time  Calculation:   Start Time: 0800  Stop Time: 0902  Time Calculation (min): 62 min  PT Non-Billable Time (min): 5 min  Timed Charges  31168 - PT Therapeutic Exercise Minutes: 47  Untimed Charges  PT E-Stim Unattended Minutes: 10  Total Minutes  Timed Charges Total Minutes: 47  Untimed Charges Total Minutes: 10   Total Minutes: 57                Kateryna Rose, PTA  11/19/2021

## 2021-11-22 ENCOUNTER — HOSPITAL ENCOUNTER (OUTPATIENT)
Dept: PHYSICAL THERAPY | Facility: HOSPITAL | Age: 21
Setting detail: THERAPIES SERIES
Discharge: HOME | End: 2021-11-22

## 2021-11-22 DIAGNOSIS — T14.8XXA MUSCLE STRAIN: Primary | ICD-10-CM

## 2021-11-22 DIAGNOSIS — M54.9 BACK PAIN, UNSPECIFIED BACK LOCATION, UNSPECIFIED BACK PAIN LATERALITY, UNSPECIFIED CHRONICITY: ICD-10-CM

## 2021-11-22 PROCEDURE — G0283 ELEC STIM OTHER THAN WOUND: HCPCS

## 2021-11-22 PROCEDURE — 97110 THERAPEUTIC EXERCISES: CPT

## 2021-11-30 PROBLEM — B97.89 VIRAL RESPIRATORY ILLNESS: Status: ACTIVE | Noted: 2021-11-30

## 2021-11-30 PROBLEM — J98.8 VIRAL RESPIRATORY ILLNESS: Status: ACTIVE | Noted: 2021-11-30

## 2021-11-30 PROCEDURE — 87635 SARS-COV-2 COVID-19 AMP PRB: CPT | Performed by: NURSE PRACTITIONER

## 2021-12-03 ENCOUNTER — HOSPITAL ENCOUNTER (OUTPATIENT)
Dept: PHYSICAL THERAPY | Facility: HOSPITAL | Age: 21
Setting detail: THERAPIES SERIES
Discharge: HOME | End: 2021-12-03

## 2021-12-03 DIAGNOSIS — T14.8XXA MUSCLE STRAIN: Primary | ICD-10-CM

## 2021-12-03 DIAGNOSIS — O99.891 BACK PAIN AFFECTING PREGNANCY IN THIRD TRIMESTER: ICD-10-CM

## 2021-12-03 DIAGNOSIS — M54.9 BACK PAIN, UNSPECIFIED BACK LOCATION, UNSPECIFIED BACK PAIN LATERALITY, UNSPECIFIED CHRONICITY: ICD-10-CM

## 2021-12-03 DIAGNOSIS — M54.9 BACK PAIN AFFECTING PREGNANCY IN THIRD TRIMESTER: ICD-10-CM

## 2021-12-03 PROCEDURE — G0283 ELEC STIM OTHER THAN WOUND: HCPCS

## 2021-12-03 PROCEDURE — 97110 THERAPEUTIC EXERCISES: CPT

## 2021-12-03 NOTE — THERAPY TREATMENT NOTE
Outpatient Physical Therapy Ortho Treatment Note  [facility]     Patient Name: Arik Fuentes  : 2000  MRN: [id number]  Today's Date: 12/3/2021      Visit Date: 2021  Subjective Improvement 65%  Visits 4/6  Visits approved 20  RTMD PRN  Recert Date 2021     Low back pain    Visit Dx:    ICD-10-CM ICD-9-CM   1. Muscle strain  T14.8XXA 848.9   2. Back pain, unspecified back location, unspecified back pain laterality, unspecified chronicity  M54.9 724.5   3. Back pain affecting pregnancy in third trimester  O99.891 646.83    M54.9 724.5       Patient Active Problem List   Diagnosis   • Late prenatal care, antepartum   • Supervision of other normal pregnancy   • Normal labor   • Single liveborn infant delivered vaginally   • Viral respiratory illness        Past Medical History:   Diagnosis Date   • Allergic rhinitis    • Atopic dermatitis    • Chondromalacia of patella    • Sleep apnea         Past Surgical History:   Procedure Laterality Date   • TONSILLECTOMY AND ADENOIDECTOMY  2005    obstructive sleep apnea syndrome        PT Ortho     Row Name 21 1100       Subjective Comments    Subjective Comments Patient states that she went ot the urgent care because of low back pain and having a cold.  She tested - for COVID and was told to take ibuprofen for pain  -CP       Subjective Pain    Able to rate subjective pain? yes  -CP    Pre-Treatment Pain Level 4  -CP          User Key  (r) = Recorded By, (t) = Taken By, (c) = Cosigned By    Initials Name Provider Type    CP Isabella Carter, PTA Physical Therapy Assistant                             PT Assessment/Plan     Row Name 21 1200          PT Assessment    Assessment Comments Patient tolerated treatment very well with no reports of increased pain during or after ther ex. Patient demonstrated significant hamstring tightness durng stretches. Patient presented with L post rotation of SI which was corrected using ME.   "-CP            PT Plan    PT Frequency 2x/week  -CP     Predicted Duration of Therapy Intervention (PT) 3-4 weeks  -CP     PT Plan Comments Contact primiary PT regarding POC. Check alignment  -CP           User Key  (r) = Recorded By, (t) = Taken By, (c) = Cosigned By    Initials Name Provider Type    CP Isabella Carter, MARKEL Physical Therapy Assistant                 Modalities     Row Name 12/03/21 1100             Ice    Ice Applied Yes  -CP      Location Low back with IFC  -CP      PT Ice Rx Minutes 15  -CP      Ice S/P Rx Yes  -CP              ELECTRICAL STIMULATION    Attended/Unattended Unattended  -CP      Stimulation Type IFC  -CP      Max mAmp 9  -CP      Location/Electrode Placement/Other low back with ice  -CP      PT E-Stim Unattended Minutes 15  -CP            User Key  (r) = Recorded By, (t) = Taken By, (c) = Cosigned By    Initials Name Provider Type    CP Isabella Catrer, PTA Physical Therapy Assistant               OP Exercises     Row Name 12/03/21 1206 12/03/21 1100          Subjective Comments    Subjective Comments -- Patient states that she went ot the urgent care because of low back pain and having a cold.  She tested - for COVID and was told to take ibuprofen for pain  -CP            Subjective Pain    Able to rate subjective pain? -- yes  -CP     Pre-Treatment Pain Level -- 4  -CP     Post-Treatment Pain Level -- 3  -CP            Total Minutes    72548 - PT Therapeutic Exercise Minutes 40  -CP --            Exercise 1    Exercise Name 1 -- Pro II LE strengthening/ROM  -CP     Time 1 -- 10 min  -CP     Additional Comments -- level 2  -CP            Exercise 2    Exercise Name 2 -- LTR stretch  -CP     Cueing 2 -- Verbal; Tactile  -CP     Sets 2 -- 1  -CP     Reps 2 -- 20  -CP            Exercise 3    Exercise Name 3 -- manual ham stretch  -CP     Cueing 3 -- Verbal; Tactile  -CP     Sets 3 -- 3  -CP     Time 3 -- 30\"  -CP     Additional Comments -- bilat  -CP            Exercise 4    " Exercise Name 4 -- bridges  -CP     Cueing 4 -- Verbal  -CP     Sets 4 -- 1  -CP     Reps 4 -- 10  -CP            Exercise 5    Exercise Name 5 -- see manual  -CP            Exercise 6    Exercise Name 6 -- see modalities  -CP           User Key  (r) = Recorded By, (t) = Taken By, (c) = Cosigned By    Initials Name Provider Type    Isabella Steen, MARKEL Physical Therapy Assistant                         Manual Rx (last 36 hours)     Manual Treatments     Row Name 12/03/21 1100             Manual Rx 1    Manual Rx 1 Location left SI  -CP      Manual Rx 1 Type ME to correct left post rotation  -CP            User Key  (r) = Recorded By, (t) = Taken By, (c) = Cosigned By    Initials Name Provider Type    Isabella Steen, MARKEL Physical Therapy Assistant                 PT OP Goals     Row Name 12/03/21 1100          PT Short Term Goals    STG Date to Achieve 12/08/21  -CP     STG 1 Pt is indpt with HEP.  -CP     STG 1 Progress Ongoing  -CP     STG 2 Pt is able to complete AROM L shoulder flexion/abduction WNLs without pain.,  -CP     STG 2 Progress Ongoing  -CP     STG 3 Pt is able to complete Trunk ROM WNLs without increased pain.  -CP     STG 3 Progress Ongoing  -CP     STG 4 Pt demos BUE strength 4+/5 wihtout pain increase.  -CP     STG 4 Progress Ongoing  -CP     STG 5 Pt demo's proper lifting mechanics for work activities to avoid recurrance of injury.  -CP     STG 5 Progress Ongoing  -CP            Time Calculation    PT Goal Re-Cert Due Date 12/01/21  -CP           User Key  (r) = Recorded By, (t) = Taken By, (c) = Cosigned By    Initials Name Provider Type    Isabella Steen, MARKEL Physical Therapy Assistant                Therapy Education  Education Details: SIJ self-correction  Given: Symptoms/condition management  Program: New  How Provided: Verbal, Demonstration  Provided to: Patient  Level of Understanding: Teach back education performed, Verbalized, Demonstrated              Time Calculation:    Start Time: 1107  Stop Time: 1202  Time Calculation (min): 55 min  Total Timed Code Minutes- PT: 40 minute(s)  Timed Charges  49215 - PT Therapeutic Exercise Minutes: 40  Untimed Charges  PT E-Stim Unattended Minutes: 15  PT Ice Rx Minutes: 15  Total Minutes  Timed Charges Total Minutes: 40  Untimed Charges Total Minutes: 30   Total Minutes: 40  Therapy Charges for Today     Code Description Service Date Service Provider Modifiers Qty    90169363942 HC PT THER PROC EA 15 MIN 12/3/2021 Isabella Carter, PTA GP 3    [id number] HC PT ELECTRICAL STIM UNATTENDED 12/3/2021 Isabella Carter, PTA  1                    Isabella Carter PTA  12/3/2021

## 2021-12-06 PROCEDURE — 87635 SARS-COV-2 COVID-19 AMP PRB: CPT | Performed by: NURSE PRACTITIONER

## 2021-12-07 ENCOUNTER — APPOINTMENT (OUTPATIENT)
Dept: PHYSICAL THERAPY | Facility: HOSPITAL | Age: 21
End: 2021-12-07

## 2021-12-09 ENCOUNTER — APPOINTMENT (OUTPATIENT)
Dept: PHYSICAL THERAPY | Facility: HOSPITAL | Age: 21
End: 2021-12-09

## 2022-01-01 DIAGNOSIS — Z30.013 ENCOUNTER FOR INITIAL PRESCRIPTION OF INJECTABLE CONTRACEPTIVE: ICD-10-CM

## 2022-01-01 PROCEDURE — 87635 SARS-COV-2 COVID-19 AMP PRB: CPT | Performed by: NURSE PRACTITIONER

## 2022-01-03 RX ORDER — MEDROXYPROGESTERONE ACETATE 150 MG/ML
150 INJECTION, SUSPENSION INTRAMUSCULAR
Qty: 1 ML | Refills: 4 | OUTPATIENT
Start: 2022-01-03

## 2022-02-25 PROBLEM — J01.10 ACUTE NON-RECURRENT FRONTAL SINUSITIS: Status: ACTIVE | Noted: 2022-02-25

## 2022-06-30 PROBLEM — K59.00 CONSTIPATION: Status: ACTIVE | Noted: 2022-06-30

## 2022-06-30 PROCEDURE — 87660 TRICHOMONAS VAGIN DIR PROBE: CPT | Performed by: NURSE PRACTITIONER

## 2022-06-30 PROCEDURE — 87510 GARDNER VAG DNA DIR PROBE: CPT | Performed by: NURSE PRACTITIONER

## 2022-06-30 PROCEDURE — 87480 CANDIDA DNA DIR PROBE: CPT | Performed by: NURSE PRACTITIONER

## 2022-07-10 PROCEDURE — 87635 SARS-COV-2 COVID-19 AMP PRB: CPT | Performed by: NURSE PRACTITIONER

## 2022-12-13 ENCOUNTER — OFFICE VISIT (OUTPATIENT)
Dept: OBSTETRICS AND GYNECOLOGY | Facility: CLINIC | Age: 22
End: 2022-12-13

## 2022-12-13 VITALS
DIASTOLIC BLOOD PRESSURE: 66 MMHG | HEIGHT: 63 IN | BODY MASS INDEX: 24.27 KG/M2 | SYSTOLIC BLOOD PRESSURE: 118 MMHG | WEIGHT: 137 LBS

## 2022-12-13 DIAGNOSIS — Z32.02 PREGNANCY TEST NEGATIVE: ICD-10-CM

## 2022-12-13 DIAGNOSIS — Z30.013 INITIATION OF DEPO PROVERA: Primary | ICD-10-CM

## 2022-12-13 LAB
B-HCG UR QL: NEGATIVE
EXPIRATION DATE: NORMAL
INTERNAL NEGATIVE CONTROL: NEGATIVE
INTERNAL POSITIVE CONTROL: POSITIVE
Lab: NORMAL

## 2022-12-13 PROCEDURE — 99212 OFFICE O/P EST SF 10 MIN: CPT | Performed by: NURSE PRACTITIONER

## 2022-12-13 PROCEDURE — 96372 THER/PROPH/DIAG INJ SC/IM: CPT | Performed by: NURSE PRACTITIONER

## 2022-12-13 PROCEDURE — 81025 URINE PREGNANCY TEST: CPT | Performed by: NURSE PRACTITIONER

## 2022-12-13 RX ORDER — MEDROXYPROGESTERONE ACETATE 150 MG/ML
150 INJECTION, SUSPENSION INTRAMUSCULAR
Qty: 1 EACH | Refills: 3 | Status: SHIPPED | OUTPATIENT
Start: 2022-12-13

## 2022-12-13 RX ORDER — MEDROXYPROGESTERONE ACETATE 150 MG/ML
150 INJECTION, SUSPENSION INTRAMUSCULAR ONCE
Status: COMPLETED | OUTPATIENT
Start: 2022-12-13 | End: 2022-12-13

## 2022-12-13 RX ADMIN — MEDROXYPROGESTERONE ACETATE 150 MG: 150 INJECTION, SUSPENSION INTRAMUSCULAR at 13:01

## 2022-12-13 NOTE — PROGRESS NOTES
Subjective   Arik Fuentes is a 22 y.o. desires depo provera    History of Present Illness  LMP: 12/05/2022  Sexually active: not currently    Pt presents with her two children with desire to restart depo provera injections.  She was on depo injections in the past and tolerated them well.  She does not have desire to conceive in the near future.        The following portions of the patient's history were reviewed and updated as appropriate: allergies, current medications, past family history, past medical history, past social history, past surgical history and problem list.    Review of Systems   Constitutional: Negative for chills, diaphoresis, fatigue, fever and unexpected weight change.   Respiratory: Negative for apnea, chest tightness and shortness of breath.    Cardiovascular: Negative for chest pain and palpitations.   Gastrointestinal: Negative for abdominal distention, abdominal pain, constipation and diarrhea.   Genitourinary: Negative for decreased urine volume, difficulty urinating, dysuria, enuresis, flank pain, frequency, genital sores, hematuria, menstrual problem, pelvic pain, urgency, vaginal bleeding, vaginal discharge and vaginal pain.   Skin: Negative for rash.   Neurological: Negative for headaches.   Psychiatric/Behavioral: Negative for sleep disturbance and suicidal ideas.         Objective   Physical Exam  Vitals and nursing note reviewed.   Constitutional:       General: She is awake. She is not in acute distress.     Appearance: Normal appearance. She is well-developed and well-groomed. She is not ill-appearing, toxic-appearing or diaphoretic.   Cardiovascular:      Rate and Rhythm: Normal rate and regular rhythm.      Heart sounds: Normal heart sounds.   Pulmonary:      Effort: Pulmonary effort is normal.      Breath sounds: Normal breath sounds.   Skin:     General: Skin is warm and dry.   Neurological:      Mental Status: She is alert and oriented to person, place, and time.    Psychiatric:         Attention and Perception: Attention and perception normal.         Mood and Affect: Mood and affect normal.         Speech: Speech normal.         Behavior: Behavior normal. Behavior is cooperative.           Assessment & Plan   Diagnoses and all orders for this visit:    1. Initiation of Depo Provera (Primary)  -     medroxyPROGESTERone (Depo-Provera) 150 MG/ML injection; Inject 1 mL into the appropriate muscle as directed by prescriber Every 3 (Three) Months.  Dispense: 1 each; Refill: 3  -     MedroxyPROGESTERone Acetate (DEPO-PROVERA) injection 150 mg    2. Pregnancy test negative  -     POC Pregnancy, Urine      UPT negative.  RBA Depo provera and reviewed potential side effects.  First injection today.  RTC for annual gynecological exam, next injection due in 3 months.

## 2023-04-21 PROCEDURE — 87480 CANDIDA DNA DIR PROBE: CPT | Performed by: NURSE PRACTITIONER

## 2023-04-21 PROCEDURE — 87660 TRICHOMONAS VAGIN DIR PROBE: CPT | Performed by: NURSE PRACTITIONER

## 2023-04-21 PROCEDURE — 87510 GARDNER VAG DNA DIR PROBE: CPT | Performed by: NURSE PRACTITIONER

## 2023-09-06 ENCOUNTER — OFFICE VISIT (OUTPATIENT)
Dept: OBSTETRICS AND GYNECOLOGY | Facility: CLINIC | Age: 23
End: 2023-09-06
Payer: COMMERCIAL

## 2023-09-06 VITALS
BODY MASS INDEX: 21.09 KG/M2 | HEIGHT: 63 IN | WEIGHT: 119 LBS | SYSTOLIC BLOOD PRESSURE: 110 MMHG | DIASTOLIC BLOOD PRESSURE: 62 MMHG

## 2023-09-06 DIAGNOSIS — Z30.013 INITIATION OF DEPO PROVERA: Primary | ICD-10-CM

## 2023-09-06 PROCEDURE — 99213 OFFICE O/P EST LOW 20 MIN: CPT | Performed by: NURSE PRACTITIONER

## 2023-09-06 PROCEDURE — 96372 THER/PROPH/DIAG INJ SC/IM: CPT | Performed by: NURSE PRACTITIONER

## 2023-09-06 RX ORDER — MEDROXYPROGESTERONE ACETATE 150 MG/ML
150 INJECTION, SUSPENSION INTRAMUSCULAR ONCE
Status: COMPLETED | OUTPATIENT
Start: 2023-09-06 | End: 2023-09-06

## 2023-09-06 RX ADMIN — MEDROXYPROGESTERONE ACETATE 150 MG: 150 INJECTION, SUSPENSION INTRAMUSCULAR at 16:08

## 2023-09-07 PROBLEM — Z37.9 NORMAL LABOR: Status: RESOLVED | Noted: 2020-10-20 | Resolved: 2023-09-07

## 2023-09-07 PROBLEM — B97.89 VIRAL RESPIRATORY ILLNESS: Status: RESOLVED | Noted: 2021-11-30 | Resolved: 2023-09-07

## 2023-09-07 PROBLEM — O09.30 LATE PRENATAL CARE, ANTEPARTUM: Status: RESOLVED | Noted: 2020-07-14 | Resolved: 2023-09-07

## 2023-09-07 PROBLEM — J98.8 VIRAL RESPIRATORY ILLNESS: Status: RESOLVED | Noted: 2021-11-30 | Resolved: 2023-09-07

## 2023-09-07 PROBLEM — Z34.80 SUPERVISION OF OTHER NORMAL PREGNANCY: Status: RESOLVED | Noted: 2020-07-14 | Resolved: 2023-09-07

## 2023-09-07 NOTE — PROGRESS NOTES
Subjective   rAik Fuentes is a 23 y.o. desires depo provera injection    History of Present Illness  LMP: 2023  Pap: never  BC: none    Pt presents with desire to re-start on depo provera injections for contraception.  She has tolerated depo injections well in the past.  She denies any unprotected intercourse since LMP.     Past Medical History:   Diagnosis Date    Allergic rhinitis     Atopic dermatitis     Chondromalacia of patella     Sleep apnea       Past Surgical History:   Procedure Laterality Date    TONSILLECTOMY AND ADENOIDECTOMY  2005    obstructive sleep apnea syndrome      OB History    Para Term  AB Living   2 2 2     2   SAB IAB Ectopic Molar Multiple Live Births           0 2      # Outcome Date GA Lbr Steffen/2nd Weight Sex Delivery Anes PTL Lv   2 Term 10/20/20 37w4d 05:36 / 00:06 2580 g (5 lb 11 oz) F Vag-Spont OTHER N MY      Birth Comments: AGA   1 Term 19 37w6d  2821 g (6 lb 3.5 oz) M Vag-Spont None N MY      Tobacco Use: Medium Risk    Smoking Tobacco Use: Former    Smokeless Tobacco Use: Never    Passive Exposure: Not on file        Review of Systems   Constitutional:  Negative for chills, diaphoresis, fatigue, fever and unexpected weight change.   Respiratory:  Negative for apnea, chest tightness and shortness of breath.    Cardiovascular:  Negative for chest pain, palpitations and leg swelling.   Gastrointestinal:  Negative for abdominal distention, abdominal pain, constipation and diarrhea.   Genitourinary:  Negative for decreased urine volume, difficulty urinating, dyspareunia, dysuria, enuresis, flank pain, frequency, genital sores, hematuria, menstrual problem, pelvic pain, urgency, vaginal bleeding, vaginal discharge and vaginal pain.   Skin:  Negative for rash.   Neurological:  Negative for headaches.   Psychiatric/Behavioral:  Negative for sleep disturbance and suicidal ideas.        Objective   Physical Exam  Vitals and nursing note reviewed.    Constitutional:       General: She is awake. She is not in acute distress.     Appearance: Normal appearance. She is well-developed and well-groomed. She is not ill-appearing, toxic-appearing or diaphoretic.   Pulmonary:      Effort: Pulmonary effort is normal.   Skin:     General: Skin is warm and dry.   Neurological:      Mental Status: She is alert and oriented to person, place, and time.   Psychiatric:         Attention and Perception: Attention and perception normal.         Mood and Affect: Mood and affect normal.         Speech: Speech normal.         Behavior: Behavior normal. Behavior is cooperative.         Assessment & Plan   Diagnoses and all orders for this visit:    1. Initiation of Depo Provera (Primary)  -     MedroxyPROGESTERone Acetate (DEPO-PROVERA) injection 150 mg    Today's UPT negative.      RBA DMPA and reviewed s/e.  First DMPA injection given today.    Offered to do annual gynecological exam with initial pap smear today. Pt declines, but agreeable for her next visit.  RTC in 3 months for annual gynecological exam and DMPA injection.